# Patient Record
Sex: FEMALE | Race: WHITE | Employment: OTHER | URBAN - METROPOLITAN AREA
[De-identification: names, ages, dates, MRNs, and addresses within clinical notes are randomized per-mention and may not be internally consistent; named-entity substitution may affect disease eponyms.]

---

## 2017-08-24 PROBLEM — Z16.12 UTI DUE TO EXTENDED-SPECTRUM BETA LACTAMASE (ESBL) PRODUCING ESCHERICHIA COLI: Status: ACTIVE | Noted: 2017-08-24

## 2017-08-24 PROBLEM — B96.29 UTI DUE TO EXTENDED-SPECTRUM BETA LACTAMASE (ESBL) PRODUCING ESCHERICHIA COLI: Status: ACTIVE | Noted: 2017-08-24

## 2017-08-24 PROBLEM — I10 HTN (HYPERTENSION): Status: ACTIVE | Noted: 2017-08-24

## 2017-08-24 PROBLEM — N39.0 UTI DUE TO EXTENDED-SPECTRUM BETA LACTAMASE (ESBL) PRODUCING ESCHERICHIA COLI: Status: ACTIVE | Noted: 2017-08-24

## 2017-08-24 PROBLEM — H54.7 BLINDNESS: Status: ACTIVE | Noted: 2017-08-24

## 2017-08-24 PROBLEM — E11.9 DM (DIABETES MELLITUS) (HCC): Status: ACTIVE | Noted: 2017-08-24

## 2017-08-24 PROBLEM — R53.1 GENERALIZED WEAKNESS: Status: ACTIVE | Noted: 2017-08-24

## 2018-01-23 PROBLEM — K21.9 GERD (GASTROESOPHAGEAL REFLUX DISEASE): Status: ACTIVE | Noted: 2018-01-23

## 2018-01-23 PROBLEM — R60.0 BILATERAL LOWER EXTREMITY EDEMA: Status: ACTIVE | Noted: 2018-01-23

## 2018-01-23 PROBLEM — L03.90 CELLULITIS: Status: ACTIVE | Noted: 2018-01-23

## 2018-01-26 PROBLEM — L03.116 BILATERAL LOWER LEG CELLULITIS: Status: ACTIVE | Noted: 2018-01-23

## 2018-01-26 PROBLEM — L03.115 BILATERAL LOWER LEG CELLULITIS: Status: ACTIVE | Noted: 2018-01-23

## 2018-01-26 PROBLEM — H54.8 LEGALLY BLIND: Status: ACTIVE | Noted: 2017-08-24

## 2018-01-26 PROBLEM — E11.65 POORLY CONTROLLED TYPE 2 DIABETES MELLITUS (HCC): Status: ACTIVE | Noted: 2017-08-24

## 2018-01-29 PROBLEM — Z71.89 DIABETES EDUCATION, ENCOUNTER FOR: Status: ACTIVE | Noted: 2017-08-24

## 2020-04-07 ENCOUNTER — TELEPHONE (OUTPATIENT)
Dept: PAIN MANAGEMENT | Age: 85
End: 2020-04-07

## 2021-08-04 ENCOUNTER — HOSPITAL ENCOUNTER (INPATIENT)
Age: 86
LOS: 7 days | Discharge: NURSING, REHAB OR LONG-TERM CARE | DRG: 478 | End: 2021-08-11
Attending: INTERNAL MEDICINE | Admitting: INTERNAL MEDICINE
Payer: MEDICAID

## 2021-08-04 ENCOUNTER — APPOINTMENT (OUTPATIENT)
Dept: CT IMAGING | Age: 86
DRG: 478 | End: 2021-08-04
Payer: MEDICAID

## 2021-08-04 ENCOUNTER — APPOINTMENT (OUTPATIENT)
Dept: GENERAL RADIOLOGY | Age: 86
DRG: 478 | End: 2021-08-04
Payer: MEDICAID

## 2021-08-04 DIAGNOSIS — R10.10 UPPER ABDOMINAL PAIN: Primary | ICD-10-CM

## 2021-08-04 DIAGNOSIS — K83.8 DILATED INTRAHEPATIC BILE DUCT: ICD-10-CM

## 2021-08-04 PROBLEM — R10.9 ABDOMINAL PAIN: Status: ACTIVE | Noted: 2021-08-04

## 2021-08-04 LAB
A/G RATIO: 1 (ref 1.1–2.2)
ALBUMIN SERPL-MCNC: 3.4 G/DL (ref 3.4–5)
ALP BLD-CCNC: 72 U/L (ref 40–129)
ALT SERPL-CCNC: 12 U/L (ref 10–40)
ANION GAP SERPL CALCULATED.3IONS-SCNC: 10 MMOL/L (ref 3–16)
AST SERPL-CCNC: 35 U/L (ref 15–37)
BASOPHILS ABSOLUTE: 0.1 K/UL (ref 0–0.2)
BASOPHILS RELATIVE PERCENT: 0.9 %
BILIRUB SERPL-MCNC: 0.3 MG/DL (ref 0–1)
BUN BLDV-MCNC: 37 MG/DL (ref 7–20)
CALCIUM SERPL-MCNC: 9.6 MG/DL (ref 8.3–10.6)
CHLORIDE BLD-SCNC: 104 MMOL/L (ref 99–110)
CO2: 26 MMOL/L (ref 21–32)
CREAT SERPL-MCNC: 1 MG/DL (ref 0.6–1.2)
EOSINOPHILS ABSOLUTE: 0.2 K/UL (ref 0–0.6)
EOSINOPHILS RELATIVE PERCENT: 3.7 %
GFR AFRICAN AMERICAN: >60
GFR NON-AFRICAN AMERICAN: 52
GLOBULIN: 3.5 G/DL
GLUCOSE BLD-MCNC: 124 MG/DL (ref 70–99)
GLUCOSE BLD-MCNC: 97 MG/DL (ref 70–99)
HCT VFR BLD CALC: 35.9 % (ref 36–48)
HEMOGLOBIN: 11.5 G/DL (ref 12–16)
LIPASE: 20 U/L (ref 13–60)
LYMPHOCYTES ABSOLUTE: 2.1 K/UL (ref 1–5.1)
LYMPHOCYTES RELATIVE PERCENT: 34.1 %
MCH RBC QN AUTO: 30.9 PG (ref 26–34)
MCHC RBC AUTO-ENTMCNC: 32 G/DL (ref 31–36)
MCV RBC AUTO: 96.7 FL (ref 80–100)
MONOCYTES ABSOLUTE: 0.5 K/UL (ref 0–1.3)
MONOCYTES RELATIVE PERCENT: 8.6 %
NEUTROPHILS ABSOLUTE: 3.3 K/UL (ref 1.7–7.7)
NEUTROPHILS RELATIVE PERCENT: 52.7 %
PDW BLD-RTO: 16.1 % (ref 12.4–15.4)
PERFORMED ON: NORMAL
PLATELET # BLD: 207 K/UL (ref 135–450)
PMV BLD AUTO: 8.7 FL (ref 5–10.5)
POTASSIUM SERPL-SCNC: 4.7 MMOL/L (ref 3.5–5.1)
RBC # BLD: 3.71 M/UL (ref 4–5.2)
SODIUM BLD-SCNC: 140 MMOL/L (ref 136–145)
TOTAL PROTEIN: 6.9 G/DL (ref 6.4–8.2)
TROPONIN: 0.02 NG/ML
WBC # BLD: 6.2 K/UL (ref 4–11)

## 2021-08-04 PROCEDURE — 74177 CT ABD & PELVIS W/CONTRAST: CPT

## 2021-08-04 PROCEDURE — 36415 COLL VENOUS BLD VENIPUNCTURE: CPT

## 2021-08-04 PROCEDURE — 96375 TX/PRO/DX INJ NEW DRUG ADDON: CPT

## 2021-08-04 PROCEDURE — 96374 THER/PROPH/DIAG INJ IV PUSH: CPT

## 2021-08-04 PROCEDURE — 6370000000 HC RX 637 (ALT 250 FOR IP): Performed by: INTERNAL MEDICINE

## 2021-08-04 PROCEDURE — 93005 ELECTROCARDIOGRAM TRACING: CPT | Performed by: PHYSICIAN ASSISTANT

## 2021-08-04 PROCEDURE — 2580000003 HC RX 258: Performed by: INTERNAL MEDICINE

## 2021-08-04 PROCEDURE — 6360000002 HC RX W HCPCS: Performed by: INTERNAL MEDICINE

## 2021-08-04 PROCEDURE — 85025 COMPLETE CBC W/AUTO DIFF WBC: CPT

## 2021-08-04 PROCEDURE — 71045 X-RAY EXAM CHEST 1 VIEW: CPT

## 2021-08-04 PROCEDURE — 2580000003 HC RX 258: Performed by: PHYSICIAN ASSISTANT

## 2021-08-04 PROCEDURE — 6360000002 HC RX W HCPCS: Performed by: PHYSICIAN ASSISTANT

## 2021-08-04 PROCEDURE — 83690 ASSAY OF LIPASE: CPT

## 2021-08-04 PROCEDURE — 6360000004 HC RX CONTRAST MEDICATION: Performed by: PHYSICIAN ASSISTANT

## 2021-08-04 PROCEDURE — 1200000000 HC SEMI PRIVATE

## 2021-08-04 PROCEDURE — 84484 ASSAY OF TROPONIN QUANT: CPT

## 2021-08-04 PROCEDURE — 81003 URINALYSIS AUTO W/O SCOPE: CPT

## 2021-08-04 PROCEDURE — 99284 EMERGENCY DEPT VISIT MOD MDM: CPT

## 2021-08-04 PROCEDURE — 80053 COMPREHEN METABOLIC PANEL: CPT

## 2021-08-04 PROCEDURE — 2500000003 HC RX 250 WO HCPCS: Performed by: INTERNAL MEDICINE

## 2021-08-04 RX ORDER — DOCUSATE SODIUM 100 MG/1
100 CAPSULE, LIQUID FILLED ORAL 2 TIMES DAILY PRN
Status: DISCONTINUED | OUTPATIENT
Start: 2021-08-04 | End: 2021-08-04

## 2021-08-04 RX ORDER — DEXTROSE MONOHYDRATE 25 G/50ML
12.5 INJECTION, SOLUTION INTRAVENOUS PRN
Status: DISCONTINUED | OUTPATIENT
Start: 2021-08-04 | End: 2021-08-11 | Stop reason: HOSPADM

## 2021-08-04 RX ORDER — ONDANSETRON 2 MG/ML
4 INJECTION INTRAMUSCULAR; INTRAVENOUS ONCE
Status: COMPLETED | OUTPATIENT
Start: 2021-08-04 | End: 2021-08-04

## 2021-08-04 RX ORDER — ATORVASTATIN CALCIUM 10 MG/1
10 TABLET, FILM COATED ORAL DAILY
COMMUNITY

## 2021-08-04 RX ORDER — HYDROMORPHONE HYDROCHLORIDE 1 MG/ML
0.5 INJECTION, SOLUTION INTRAMUSCULAR; INTRAVENOUS; SUBCUTANEOUS EVERY 4 HOURS PRN
Status: DISCONTINUED | OUTPATIENT
Start: 2021-08-04 | End: 2021-08-11 | Stop reason: HOSPADM

## 2021-08-04 RX ORDER — PANTOPRAZOLE SODIUM 40 MG/1
40 TABLET, DELAYED RELEASE ORAL
Status: DISCONTINUED | OUTPATIENT
Start: 2021-08-05 | End: 2021-08-11 | Stop reason: HOSPADM

## 2021-08-04 RX ORDER — GLIPIZIDE 5 MG/1
5 TABLET ORAL
Status: DISCONTINUED | OUTPATIENT
Start: 2021-08-05 | End: 2021-08-04

## 2021-08-04 RX ORDER — POLYETHYLENE GLYCOL 3350 17 G/17G
17 POWDER, FOR SOLUTION ORAL DAILY
COMMUNITY

## 2021-08-04 RX ORDER — TIMOLOL MALEATE 5 MG/ML
1 SOLUTION/ DROPS OPHTHALMIC 2 TIMES DAILY
Status: DISCONTINUED | OUTPATIENT
Start: 2021-08-04 | End: 2021-08-11 | Stop reason: HOSPADM

## 2021-08-04 RX ORDER — CLONIDINE HYDROCHLORIDE 0.1 MG/1
0.1 TABLET ORAL 2 TIMES DAILY PRN
Status: ON HOLD | COMMUNITY
End: 2021-08-11 | Stop reason: HOSPADM

## 2021-08-04 RX ORDER — LOPERAMIDE HYDROCHLORIDE 2 MG/1
2 TABLET ORAL 4 TIMES DAILY PRN
COMMUNITY

## 2021-08-04 RX ORDER — CETIRIZINE HYDROCHLORIDE 10 MG/1
5 TABLET ORAL DAILY
Status: DISCONTINUED | OUTPATIENT
Start: 2021-08-05 | End: 2021-08-11 | Stop reason: HOSPADM

## 2021-08-04 RX ORDER — BISACODYL 10 MG
10 SUPPOSITORY, RECTAL RECTAL DAILY PRN
Status: DISCONTINUED | OUTPATIENT
Start: 2021-08-04 | End: 2021-08-04

## 2021-08-04 RX ORDER — ASPIRIN 81 MG/1
81 TABLET, CHEWABLE ORAL DAILY
COMMUNITY

## 2021-08-04 RX ORDER — M-VIT,TX,IRON,MINS/CALC/FOLIC 27MG-0.4MG
1 TABLET ORAL DAILY
Status: DISCONTINUED | OUTPATIENT
Start: 2021-08-05 | End: 2021-08-11 | Stop reason: HOSPADM

## 2021-08-04 RX ORDER — LOPERAMIDE HYDROCHLORIDE 2 MG/1
2 TABLET ORAL 4 TIMES DAILY PRN
Status: DISCONTINUED | OUTPATIENT
Start: 2021-08-04 | End: 2021-08-04

## 2021-08-04 RX ORDER — LIDOCAINE 4 G/G
1 PATCH TOPICAL DAILY
Status: DISCONTINUED | OUTPATIENT
Start: 2021-08-05 | End: 2021-08-04

## 2021-08-04 RX ORDER — DEXTROSE MONOHYDRATE 50 MG/ML
100 INJECTION, SOLUTION INTRAVENOUS PRN
Status: DISCONTINUED | OUTPATIENT
Start: 2021-08-04 | End: 2021-08-11 | Stop reason: HOSPADM

## 2021-08-04 RX ORDER — ONDANSETRON 2 MG/ML
4 INJECTION INTRAMUSCULAR; INTRAVENOUS EVERY 6 HOURS PRN
Status: DISCONTINUED | OUTPATIENT
Start: 2021-08-04 | End: 2021-08-11 | Stop reason: HOSPADM

## 2021-08-04 RX ORDER — LORATADINE 10 MG/1
10 TABLET ORAL DAILY
COMMUNITY

## 2021-08-04 RX ORDER — 0.9 % SODIUM CHLORIDE 0.9 %
1000 INTRAVENOUS SOLUTION INTRAVENOUS ONCE
Status: COMPLETED | OUTPATIENT
Start: 2021-08-04 | End: 2021-08-04

## 2021-08-04 RX ORDER — TORSEMIDE 20 MG/1
60 TABLET ORAL DAILY
COMMUNITY

## 2021-08-04 RX ORDER — GLUCOSAM/CHONDRO/HERB 149/HYAL 750-100 MG
1 TABLET ORAL DAILY
Status: DISCONTINUED | OUTPATIENT
Start: 2021-08-05 | End: 2021-08-04

## 2021-08-04 RX ORDER — ESTRADIOL 0.1 MG/G
2 CREAM VAGINAL SEE ADMIN INSTRUCTIONS
COMMUNITY

## 2021-08-04 RX ORDER — ESTRADIOL 0.1 MG/G
2 CREAM VAGINAL
Status: DISCONTINUED | OUTPATIENT
Start: 2021-08-05 | End: 2021-08-11 | Stop reason: HOSPADM

## 2021-08-04 RX ORDER — AMLODIPINE BESYLATE 10 MG/1
10 TABLET ORAL NIGHTLY
COMMUNITY

## 2021-08-04 RX ORDER — BRIMONIDINE TARTRATE 2 MG/ML
1 SOLUTION/ DROPS OPHTHALMIC 2 TIMES DAILY
Status: DISCONTINUED | OUTPATIENT
Start: 2021-08-04 | End: 2021-08-11 | Stop reason: HOSPADM

## 2021-08-04 RX ORDER — NYSTATIN 100000 [USP'U]/G
POWDER TOPICAL PRN
COMMUNITY

## 2021-08-04 RX ORDER — ALLOPURINOL 100 MG/1
100 TABLET ORAL DAILY
COMMUNITY

## 2021-08-04 RX ORDER — NICOTINE POLACRILEX 4 MG
15 LOZENGE BUCCAL PRN
Status: DISCONTINUED | OUTPATIENT
Start: 2021-08-04 | End: 2021-08-11 | Stop reason: HOSPADM

## 2021-08-04 RX ORDER — MORPHINE SULFATE 4 MG/ML
4 INJECTION, SOLUTION INTRAMUSCULAR; INTRAVENOUS EVERY 30 MIN PRN
Status: DISCONTINUED | OUTPATIENT
Start: 2021-08-04 | End: 2021-08-04 | Stop reason: ALTCHOICE

## 2021-08-04 RX ORDER — INSULIN LISPRO 100 [IU]/ML
10 INJECTION, SOLUTION INTRAVENOUS; SUBCUTANEOUS
Status: DISCONTINUED | OUTPATIENT
Start: 2021-08-04 | End: 2021-08-11 | Stop reason: HOSPADM

## 2021-08-04 RX ORDER — SENNA AND DOCUSATE SODIUM 50; 8.6 MG/1; MG/1
1 TABLET, FILM COATED ORAL DAILY
Status: DISCONTINUED | OUTPATIENT
Start: 2021-08-05 | End: 2021-08-11 | Stop reason: HOSPADM

## 2021-08-04 RX ORDER — ACETAMINOPHEN 500 MG
500 TABLET ORAL 3 TIMES DAILY
Status: DISCONTINUED | OUTPATIENT
Start: 2021-08-04 | End: 2021-08-11 | Stop reason: HOSPADM

## 2021-08-04 RX ORDER — SENNA AND DOCUSATE SODIUM 50; 8.6 MG/1; MG/1
1 TABLET, FILM COATED ORAL DAILY
COMMUNITY

## 2021-08-04 RX ORDER — SODIUM CHLORIDE 9 MG/ML
INJECTION, SOLUTION INTRAVENOUS CONTINUOUS
Status: DISCONTINUED | OUTPATIENT
Start: 2021-08-04 | End: 2021-08-11 | Stop reason: HOSPADM

## 2021-08-04 RX ORDER — TORSEMIDE 20 MG/1
60 TABLET ORAL DAILY
Status: DISCONTINUED | OUTPATIENT
Start: 2021-08-05 | End: 2021-08-11 | Stop reason: HOSPADM

## 2021-08-04 RX ORDER — ATENOLOL 25 MG/1
12.5 TABLET ORAL DAILY
Status: DISCONTINUED | OUTPATIENT
Start: 2021-08-05 | End: 2021-08-11 | Stop reason: HOSPADM

## 2021-08-04 RX ORDER — DORZOLAMIDE HCL 20 MG/ML
1 SOLUTION/ DROPS OPHTHALMIC 2 TIMES DAILY
Status: DISCONTINUED | OUTPATIENT
Start: 2021-08-04 | End: 2021-08-11 | Stop reason: HOSPADM

## 2021-08-04 RX ORDER — ALOGLIPTIN 25 MG/1
25 TABLET, FILM COATED ORAL DAILY
Status: DISCONTINUED | OUTPATIENT
Start: 2021-08-05 | End: 2021-08-04

## 2021-08-04 RX ORDER — FUROSEMIDE 40 MG/1
40 TABLET ORAL DAILY
Status: DISCONTINUED | OUTPATIENT
Start: 2021-08-05 | End: 2021-08-04

## 2021-08-04 RX ORDER — DORZOLAMIDE HYDROCHLORIDE AND TIMOLOL MALEATE 20; 5 MG/ML; MG/ML
1 SOLUTION/ DROPS OPHTHALMIC 2 TIMES DAILY
Status: DISCONTINUED | OUTPATIENT
Start: 2021-08-04 | End: 2021-08-04 | Stop reason: CLARIF

## 2021-08-04 RX ORDER — VITAMIN E 268 MG
400 CAPSULE ORAL DAILY
Status: DISCONTINUED | OUTPATIENT
Start: 2021-08-05 | End: 2021-08-11 | Stop reason: HOSPADM

## 2021-08-04 RX ORDER — NIFEDIPINE 30 MG/1
60 TABLET, EXTENDED RELEASE ORAL DAILY
Status: DISCONTINUED | OUTPATIENT
Start: 2021-08-05 | End: 2021-08-04

## 2021-08-04 RX ORDER — ALLOPURINOL 100 MG/1
100 TABLET ORAL DAILY
Status: DISCONTINUED | OUTPATIENT
Start: 2021-08-05 | End: 2021-08-11 | Stop reason: HOSPADM

## 2021-08-04 RX ORDER — ACETAMINOPHEN 500 MG
500 TABLET ORAL 3 TIMES DAILY
COMMUNITY

## 2021-08-04 RX ORDER — AMLODIPINE BESYLATE 5 MG/1
10 TABLET ORAL DAILY
Status: DISCONTINUED | OUTPATIENT
Start: 2021-08-05 | End: 2021-08-11 | Stop reason: HOSPADM

## 2021-08-04 RX ORDER — BISACODYL 10 MG
10 SUPPOSITORY, RECTAL RECTAL DAILY PRN
COMMUNITY

## 2021-08-04 RX ORDER — CLONIDINE HYDROCHLORIDE 0.1 MG/1
0.1 TABLET ORAL 2 TIMES DAILY PRN
Status: DISCONTINUED | OUTPATIENT
Start: 2021-08-04 | End: 2021-08-04

## 2021-08-04 RX ORDER — ATORVASTATIN CALCIUM 10 MG/1
10 TABLET, FILM COATED ORAL DAILY
Status: DISCONTINUED | OUTPATIENT
Start: 2021-08-05 | End: 2021-08-11 | Stop reason: HOSPADM

## 2021-08-04 RX ORDER — ASPIRIN 81 MG/1
81 TABLET, CHEWABLE ORAL DAILY
Status: DISCONTINUED | OUTPATIENT
Start: 2021-08-05 | End: 2021-08-11 | Stop reason: HOSPADM

## 2021-08-04 RX ORDER — LISINOPRIL 10 MG/1
10 TABLET ORAL DAILY
Status: DISCONTINUED | OUTPATIENT
Start: 2021-08-05 | End: 2021-08-04

## 2021-08-04 RX ORDER — CALCIUM CARBONATE 500(1250)
600 TABLET ORAL DAILY
Status: DISCONTINUED | OUTPATIENT
Start: 2021-08-05 | End: 2021-08-04

## 2021-08-04 RX ORDER — LACTOBACILLUS RHAMNOSUS GG 10B CELL
1 CAPSULE ORAL DAILY
Status: DISCONTINUED | OUTPATIENT
Start: 2021-08-05 | End: 2021-08-11 | Stop reason: HOSPADM

## 2021-08-04 RX ORDER — POLYETHYLENE GLYCOL 3350 17 G/17G
17 POWDER, FOR SOLUTION ORAL DAILY
Status: DISCONTINUED | OUTPATIENT
Start: 2021-08-05 | End: 2021-08-11 | Stop reason: HOSPADM

## 2021-08-04 RX ADMIN — MICONAZOLE NITRATE: 20 POWDER TOPICAL at 23:13

## 2021-08-04 RX ADMIN — DORZOLAMIDE HYDROCHLORIDE 1 DROP: 20 SOLUTION/ DROPS OPHTHALMIC at 23:23

## 2021-08-04 RX ADMIN — BRIMONIDINE TARTRATE 1 DROP: 2 SOLUTION OPHTHALMIC at 23:24

## 2021-08-04 RX ADMIN — SODIUM CHLORIDE: 9 INJECTION, SOLUTION INTRAVENOUS at 23:08

## 2021-08-04 RX ADMIN — Medication 1000 MG: at 23:12

## 2021-08-04 RX ADMIN — ONDANSETRON 4 MG: 2 INJECTION INTRAMUSCULAR; INTRAVENOUS at 16:25

## 2021-08-04 RX ADMIN — ACETAMINOPHEN 500 MG: 500 TABLET, FILM COATED ORAL at 23:08

## 2021-08-04 RX ADMIN — IOPAMIDOL 75 ML: 755 INJECTION, SOLUTION INTRAVENOUS at 17:30

## 2021-08-04 RX ADMIN — TIMOLOL MALEATE 1 DROP: 5 SOLUTION OPHTHALMIC at 23:23

## 2021-08-04 RX ADMIN — MORPHINE SULFATE 4 MG: 4 INJECTION, SOLUTION INTRAMUSCULAR; INTRAVENOUS at 16:25

## 2021-08-04 RX ADMIN — SODIUM CHLORIDE 1000 ML: 9 INJECTION, SOLUTION INTRAVENOUS at 16:27

## 2021-08-04 ASSESSMENT — ENCOUNTER SYMPTOMS
DIARRHEA: 0
ABDOMINAL PAIN: 1
NAUSEA: 0
VOMITING: 0
RHINORRHEA: 0
SHORTNESS OF BREATH: 0
COUGH: 0

## 2021-08-04 ASSESSMENT — PAIN DESCRIPTION - LOCATION
LOCATION: BACK;ABDOMEN
LOCATION: BACK;ABDOMEN

## 2021-08-04 ASSESSMENT — PAIN DESCRIPTION - ORIENTATION: ORIENTATION: LEFT

## 2021-08-04 ASSESSMENT — PAIN DESCRIPTION - PAIN TYPE
TYPE: ACUTE PAIN
TYPE: ACUTE PAIN

## 2021-08-04 ASSESSMENT — PAIN SCALES - GENERAL
PAINLEVEL_OUTOF10: 6
PAINLEVEL_OUTOF10: 3
PAINLEVEL_OUTOF10: 2

## 2021-08-04 NOTE — ED PROVIDER NOTES
I was asked for an EKG interpretation. Otherwise, I did not evaluate the patient. I was available for consultation. EKG  The Ekg interpreted by me in the absence of a cardiologist shows. 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 No new ST-T wave changes appreciated when compared to prior EKG dated January 23, 2018  No evidence of acute ischemia.        Mike Vasquez MD  08/04/21 3483

## 2021-08-04 NOTE — ED NOTES
Bed: 09  Expected date:   Expected time:   Means of arrival: [facility]  Comments:  [address] RN  08/04/21 8919

## 2021-08-04 NOTE — ED PROVIDER NOTES
[facility]        Pt Name: Katrin Mantilla  MRN: [id number]  [place] 3/20/1933  Date of evaluation: 8/4/2021  Provider: Linette Medina PA-C  PCP: Yohan Amor  Note Started: 5:34 PM EDT       MALIHA. I have evaluated this patient. My supervising physician was available for consultation. CHIEF COMPLAINT       Chief Complaint   Patient presents with    Abdominal Pain     Pt. arrived via EMS with c/o abdominal pain, flank pain, and states that she had a US done yesterday at the facility and stated that the tech told her that there are gallstones. Pt. is unsure of how many present       HISTORY OF PRESENT ILLNESS   (Location, Timing/Onset, Context/Setting, Quality, Duration, Modifying Factors, Severity, Associated Signs and Symptoms)  Note limiting factors. Chief Complaint: Abdominal pain    Katrin Mantilla is a 80 y.o. female who presents to the emergency department today for evaluation for right upper quadrant abdominal pain and right-sided flank pain. The patient states that she has been experiencing the symptoms and she states that they have been ongoing for the past 7 to 10 days. The patient states that initially they thought that her pain was due to a muscle relaxer, as she states that her pain would worsen if she would move around. The patient states that she continued to have pain, and she states an ultrasound was performed 2 days ago which did show gallstones patient states that she continues to have pain although she states that she is at the same pain for the past 7 to 10days patient currently is rating her pain as a 6/10. She states that her pain improves if she lies on her left side, and does not move, seems to be worse with movement. Patient states that she was nauseous couple days ago but is otherwise not had any nausea, vomiting or diarrhea. She is not had any fever or chills. No cough or congestion. No chest pain or shortness of breath. No urinary symptoms. Nursing Notes were all reviewed and agreed with or any disagreements were addressed in the HPI. REVIEW OF SYSTEMS    (2-9 systems for level 4, 10 or more for level 5)     Review of Systems   Constitutional: Negative for activity change, appetite change, chills and fever. HENT: Negative for congestion and rhinorrhea. Respiratory: Negative for cough and shortness of breath. Cardiovascular: Negative for chest pain. Gastrointestinal: Positive for abdominal pain. Negative for diarrhea, nausea and vomiting. Genitourinary: Negative for difficulty urinating, dysuria and hematuria. Positives and Pertinent negatives as per HPI. Except as noted above in the ROS, all other systems were reviewed and negative.        PAST MEDICAL HISTORY     Past Medical History:   Diagnosis Date    Diabetes mellitus ([address]     Edema leg     ESBL (extended spectrum beta-lactamase) producing bacteria infection 01/23/2018    urine    GERD (gastroesophageal reflux disease)     Hypercholesteremia     Hypertension     Lumbar spinal stenosis     Osteoarthritis     Thyroid disease     had radioactive iodine    UTI          SURGICAL HISTORY     Past Surgical History:   Procedure Laterality Date    DILATION AND CURETTAGE OF UTERUS      LEG SURGERY      [facility]       Previous Medications    ACETAMINOPHEN (TYLENOL) 500 MG TABLET    Take 500 mg by mouth 3 times daily    ALLOPURINOL (ZYLOPRIM) 100 MG TABLET    Take 100 mg by mouth daily    AMLODIPINE (NORVASC) 10 MG TABLET    Take 10 mg by mouth daily    ASPIRIN 81 MG CHEWABLE TABLET    Take 81 mg by mouth daily    ATENOLOL (TENORMIN) 25 MG TABLET    Take 0.5 tablets by mouth daily    ATORVASTATIN (LIPITOR) 10 MG TABLET    Take 10 mg by mouth daily    BISACODYL (DULCOLAX) 10 MG SUPPOSITORY    Place 10 mg rectally daily as needed for Constipation    BRIMONIDINE (ALPHAGAN P) 0.1 % SOLN    Place 1 drop into both eyes 2 times daily     CALCIUM 600-400 MG-UNIT CHEW    Take 1 tablet by mouth 2 times daily    CALCIUM CARBONATE (OSCAL) 500 MG TABS TABLET    Take 600 mg by mouth daily    CLONIDINE (CATAPRES) 0.1 MG TABLET    Take 0.1 mg by mouth 2 times daily as needed for High Blood Pressure (SBP >160)    DICLOFENAC SODIUM 1 % GEL    Apply  topically 2 times daily. DOCUSATE SODIUM (COLACE) 100 MG CAPSULE    Take 100 mg by mouth 2 times daily as needed for Constipation    DOCUSATE SODIUM (COLACE, DULCOLAX) 100 MG CAPS    Take 100 mg by mouth 2 times daily as needed for Constipation    DORZOLAMIDE-TIMOLOL (COSOPT) 22.3-6.8 MG/ML OPHTHALMIC SOLUTION    Place 1 drop into both eyes 2 times daily     ESTRADIOL (ESTRACE) 0.1 MG/GM VAGINAL CREAM    Place 2 g vaginally See Admin Instructions Every Monday and Thursday    FUROSEMIDE (LASIX) 40 MG TABLET    Take 1 tablet by mouth daily    GLIMEPIRIDE (AMARYL) 1 MG TABLET    Take 2 mg by mouth every morning (before breakfast)     HYDROCORTISONE 2.5 % OINTMENT    Apply topically daily Apply to BLE rash    INSULIN GLARGINE (BASAGLAR KWIKPEN SC)    Inject 25 Units into the skin daily    INSULIN LISPRO (HUMALOG) 100 UNIT/ML INJECTION VIAL    Inject 10 Units into the skin 3 times daily (before meals)    LACTOBACILLUS (ACIDOPHILUS PO)    Take 1 tablet by mouth daily    LIDOCAINE (LIDODERM) 5 %    Place 1 patch onto the skin daily. 12 hours on, 12 hours off. LISINOPRIL (PRINIVIL;ZESTRIL) 10 MG TABLET    Take 1 tablet by mouth daily    LOPERAMIDE (IMODIUM A-D) 2 MG TABLET    Take 2 mg by mouth 4 times daily as needed for Diarrhea    LORATADINE (CLARITIN) 10 MG TABLET    Take 10 mg by mouth daily    MAGNESIUM HYDROXIDE (MILK OF MAGNESIA) 400 MG/5ML SUSPENSION    Take 30 mLs by mouth daily as needed for Constipation    MICONAZOLE (MICOTIN) 2 % POWDER    Apply topically 2 times daily Apply topically 2 times daily. MISC NATURAL PRODUCTS (GLUCOSAMINE CHOND COMPLEX/MSM PO)    Take by mouth    MULTIPLE VITAMINS-MINERALS (THERAPEUTIC MULTIVITAMIN-MINERALS) TABLET    Take 1 tablet by mouth daily    NIFEDIPINE (PROCARDIA XL) 60 MG CR TABLET    Take 60 mg by mouth daily. NYSTATIN (MYCOSTATIN) 729173 UNIT/GM POWDER    Apply topically as needed Under breast    OMEPRAZOLE (PRILOSEC) 10 MG CAPSULE    Take 20 mg by mouth daily. POLYETHYLENE GLYCOL (GLYCOLAX) 17 GM/SCOOP POWDER    Take 17 g by mouth daily    SENNOSIDES-DOCUSATE SODIUM (SENOKOT-S) 8.6-50 MG TABLET    Take 1 tablet by mouth daily    SITAGLIPTAN (JANUVIA) 50 MG TABLET    Take 50 mg by mouth daily. TORSEMIDE (DEMADEX) 20 MG TABLET    Take 60 mg by mouth daily    VITAMIN E 400 UNIT CAPSULE    Take 400 Units by mouth daily         ALLERGIES     Adhesive tape and Simvastatin    FAMILYHISTORY       Family History   Problem Relation Age of Onset    Diabetes Mother     Other Mother     Cancer Paternal Aunt     Cancer Paternal Uncle           SOCIAL HISTORY       Social History     Tobacco Use    Smoking status: Never Smoker   Substance Use Topics    Alcohol use: No    Drug use: No       SCREENINGS             PHYSICAL EXAM    (up to 7 for level 4, 8 or more for level 5)     ED Triage Vitals   BP Temp Temp Source Pulse Resp SpO2 Height Weight   08/04/21 1557 08/04/21 1559 08/04/21 1559 08/04/21 1559 08/04/21 1605 08/04/21 1559 08/04/21 1559 08/04/21 1559   (!) 161/71 97.7 °F (36.5 °C) Oral 58 18 99 % 5' 8\" (1.727 m) 260 lb (117.9 kg)       Physical Exam  Vitals and nursing note reviewed. Constitutional:       Appearance: She is well-developed. She is not diaphoretic. HENT:      Head: Normocephalic and atraumatic. Right Ear: External ear normal.      Left Ear: External ear normal.      Nose: Nose normal.   Eyes:      General:         Right eye: No discharge. Left eye: No discharge. Neck:      Trachea: No tracheal deviation.    Cardiovascular: Rate and Rhythm: Regular rhythm. Bradycardia present. Pulmonary:      Effort: Pulmonary effort is normal. No respiratory distress. Breath sounds: Normal breath sounds. No wheezing or rales. Abdominal:      General: Bowel sounds are normal. There is no distension. Palpations: Abdomen is soft. Tenderness: There is abdominal tenderness in the right upper quadrant, epigastric area and left upper quadrant. There is no guarding. Musculoskeletal:         General: Normal range of motion. Cervical back: Normal range of motion and neck supple. Skin:     General: Skin is warm and dry. Neurological:      Mental Status: She is alert and oriented to person, place, and time.    Psychiatric:         Behavior: Behavior normal.         DIAGNOSTIC RESULTS   LABS:    Labs Reviewed   CBC WITH AUTO DIFFERENTIAL - Abnormal; Notable for the following components:       Result Value    RBC 3.71 (*)     Hemoglobin 11.5 (*)     Hematocrit 35.9 (*)     RDW 16.1 (*)     All other components within normal limits    Narrative:     Performed at:  [facility] [address], [place], [address] [place]   Phone [phone]   COMPREHENSIVE METABOLIC PANEL - Abnormal; Notable for the following components:    Glucose 124 (*)     BUN 37 (*)     GFR Non-African American 52 (*)     Albumin/Globulin Ratio 1.0 (*)     All other components within normal limits    Narrative:     Performed at:  [facility] [address], [place], [address] [place]   Phone [phone]   TROPONIN - Abnormal; Notable for the following components:    Troponin 0.02 (*)     All other components within normal limits    Narrative:     Performed at:  [facility] [address],  [place] [address] [place]   Phone [phone]   LIPASE    Narrative:     Performed at:  [facility] [address],  [place], [facility] [place]   Phone [phone]   URINE RT REFLEX TO CULTURE       When ordered only abnormal lab results are displayed. All other labs were within normal range or not returned as of this dictation. EKG: When ordered, EKG's are interpreted by the Emergency Department Physician in the absence of a cardiologist.  Please see their note for interpretation of EKG. RADIOLOGY:   Non-plain film images such as CT, Ultrasound and MRI are read by the radiologist. Plain radiographic images are visualized and preliminarily interpreted by the ED Provider with the below findings:        Interpretation per the Radiologist below, if available at the time of this note:    CT ABDOMEN PELVIS W IV CONTRAST Additional Contrast? None   Final Result   1. Nonspecific mild intrahepatic and extrahepatic biliary dilatation without   definite obstructing stone within limits of CT. Correlate with laboratory   values. Dedicated MRCP or ERCP could be obtained for further evaluation as   clinically indicated. 2. Small right pleural effusion and associated atelectasis. 3. Severely atrophic pancreas with numerous calcifications present suggesting   sequela of remote pancreatitis. 4. Moderate volume of stool throughout the colon with no evidence for bowel   obstruction. 5. Heterogeneous appearance of the uterus which is not well evaluated on CT. Ultrasound could be obtained for further evaluation on a nonemergent basis as   clinically indicated taking into account patient age and comorbidities. 6. Severe atherosclerotic disease. 7. 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 Approximately 10% height loss. Dedicated MRI of the lumbar spine could be   obtained for further evaluation if the patient has acute back pain.          XR CHEST PORTABLE   Final Result   Stable borderline cardiomegaly with minimal central pulmonary congestion or   pulmonary artery past 7 to 10 days. She had an ultrasound done at the nursing home facility which showed bowel sounds. Patient continues to have pain    On examination, she does have diffuse tenderness across her upper abdomen. Although I do not suspect ACS due to the patient's age and report of upper abdominal pain, EKG and troponin will be obtained. EKG as documented by my attending, please see his note for further details. CBC shows no evidence of leukocytosis, mild anemia. Her CMP did show dehydration with BUN of 37, therefore initially fluids were ordered, however chest x-ray does show possible pulmonary congestion, will DC fluids    troponin is elevated at 0.02, however I feel this is likely secondary to renal function, also due to specimen hemolysis. CT of the abdomen and pelvis shows nonspecific mild intrahepatic and extrahepatic biliary dilatation. There is a small right pleural effusion. patient continues to have upper abdominal pain, and concern for her intrahepatic and extrahepatic biliary ductal dilatation feel that she will need to be admitted for further care and evaluation. Dr. Modesto Fowler has agreed for admission, patient is stable for admission      FINAL IMPRESSION      1. Upper abdominal pain    2. Dilated intrahepatic bile duct          DISPOSITION/PLAN   DISPOSITION Admitted 08/04/2021 07:20:47 PM      PATIENT REFERRED TO:  No follow-up provider specified.     DISCHARGE MEDICATIONS:  New Prescriptions    No medications on file       DISCONTINUED MEDICATIONS:  Discontinued Medications    No medications on file              (Please note that portions of this note were completed with a voice recognition program.  Efforts were made to edit the dictations but occasionally words are mis-transcribed.)    Balbir Saeed PA-C (electronically signed)            Balbir Saeed PA-C  08/04/21 1946

## 2021-08-05 LAB
A/G RATIO: 0.9 (ref 1.1–2.2)
ALBUMIN SERPL-MCNC: 3.3 G/DL (ref 3.4–5)
ALP BLD-CCNC: 72 U/L (ref 40–129)
ALT SERPL-CCNC: 10 U/L (ref 10–40)
ANION GAP SERPL CALCULATED.3IONS-SCNC: 14 MMOL/L (ref 3–16)
AST SERPL-CCNC: 21 U/L (ref 15–37)
BILIRUB SERPL-MCNC: 0.4 MG/DL (ref 0–1)
BILIRUBIN URINE: NEGATIVE
BLOOD, URINE: NEGATIVE
BUN BLDV-MCNC: 37 MG/DL (ref 7–20)
CALCIUM SERPL-MCNC: 9.3 MG/DL (ref 8.3–10.6)
CHLORIDE BLD-SCNC: 106 MMOL/L (ref 99–110)
CLARITY: CLEAR
CO2: 21 MMOL/L (ref 21–32)
COLOR: YELLOW
CREAT SERPL-MCNC: 1 MG/DL (ref 0.6–1.2)
EKG ATRIAL RATE: 59 BPM
EKG DIAGNOSIS: NORMAL
EKG P AXIS: 96 DEGREES
EKG P-R INTERVAL: 234 MS
EKG Q-T INTERVAL: 502 MS
EKG QRS DURATION: 142 MS
EKG QTC CALCULATION (BAZETT): 496 MS
EKG R AXIS: -48 DEGREES
EKG T AXIS: -36 DEGREES
EKG VENTRICULAR RATE: 59 BPM
GFR AFRICAN AMERICAN: >60
GFR NON-AFRICAN AMERICAN: 52
GLOBULIN: 3.5 G/DL
GLUCOSE BLD-MCNC: 124 MG/DL (ref 70–99)
GLUCOSE BLD-MCNC: 129 MG/DL (ref 70–99)
GLUCOSE BLD-MCNC: 134 MG/DL (ref 70–99)
GLUCOSE BLD-MCNC: 140 MG/DL (ref 70–99)
GLUCOSE BLD-MCNC: 161 MG/DL (ref 70–99)
GLUCOSE BLD-MCNC: 209 MG/DL (ref 70–99)
GLUCOSE URINE: NEGATIVE MG/DL
HCT VFR BLD CALC: 33.9 % (ref 36–48)
HEMOGLOBIN: 10.9 G/DL (ref 12–16)
KETONES, URINE: NEGATIVE MG/DL
LEUKOCYTE ESTERASE, URINE: NEGATIVE
LIPASE: 13 U/L (ref 13–60)
MCH RBC QN AUTO: 31.5 PG (ref 26–34)
MCHC RBC AUTO-ENTMCNC: 32.3 G/DL (ref 31–36)
MCV RBC AUTO: 97.8 FL (ref 80–100)
MICROSCOPIC EXAMINATION: NORMAL
NITRITE, URINE: NEGATIVE
PDW BLD-RTO: 16.5 % (ref 12.4–15.4)
PERFORMED ON: ABNORMAL
PH UA: 5.5 (ref 5–8)
PLATELET # BLD: 184 K/UL (ref 135–450)
PMV BLD AUTO: 8.6 FL (ref 5–10.5)
POTASSIUM SERPL-SCNC: 4.4 MMOL/L (ref 3.5–5.1)
PROTEIN UA: NEGATIVE MG/DL
RBC # BLD: 3.47 M/UL (ref 4–5.2)
SODIUM BLD-SCNC: 141 MMOL/L (ref 136–145)
SPECIFIC GRAVITY UA: 1.02 (ref 1–1.03)
TOTAL PROTEIN: 6.8 G/DL (ref 6.4–8.2)
URINE REFLEX TO CULTURE: NORMAL
URINE TYPE: NORMAL
UROBILINOGEN, URINE: 0.2 E.U./DL
WBC # BLD: 6.5 K/UL (ref 4–11)

## 2021-08-05 PROCEDURE — 6360000002 HC RX W HCPCS: Performed by: INTERNAL MEDICINE

## 2021-08-05 PROCEDURE — 36415 COLL VENOUS BLD VENIPUNCTURE: CPT

## 2021-08-05 PROCEDURE — 99223 1ST HOSP IP/OBS HIGH 75: CPT | Performed by: SURGERY

## 2021-08-05 PROCEDURE — 93010 ELECTROCARDIOGRAM REPORT: CPT | Performed by: INTERNAL MEDICINE

## 2021-08-05 PROCEDURE — 1200000000 HC SEMI PRIVATE

## 2021-08-05 PROCEDURE — 80053 COMPREHEN METABOLIC PANEL: CPT

## 2021-08-05 PROCEDURE — 6370000000 HC RX 637 (ALT 250 FOR IP): Performed by: INTERNAL MEDICINE

## 2021-08-05 PROCEDURE — 83690 ASSAY OF LIPASE: CPT

## 2021-08-05 PROCEDURE — 2500000003 HC RX 250 WO HCPCS: Performed by: INTERNAL MEDICINE

## 2021-08-05 PROCEDURE — 85027 COMPLETE CBC AUTOMATED: CPT

## 2021-08-05 RX ADMIN — INSULIN GLARGINE 25 UNITS: 100 INJECTION, SOLUTION SUBCUTANEOUS at 09:00

## 2021-08-05 RX ADMIN — POLYETHYLENE GLYCOL 3350 17 G: 17 POWDER, FOR SOLUTION ORAL at 08:47

## 2021-08-05 RX ADMIN — ACETAMINOPHEN 500 MG: 500 TABLET, FILM COATED ORAL at 14:22

## 2021-08-05 RX ADMIN — TORSEMIDE 60 MG: 20 TABLET ORAL at 08:47

## 2021-08-05 RX ADMIN — Medication 400 UNITS: at 08:47

## 2021-08-05 RX ADMIN — CETIRIZINE HYDROCHLORIDE 5 MG: 10 TABLET, FILM COATED ORAL at 08:46

## 2021-08-05 RX ADMIN — STANDARDIZED SENNA CONCENTRATE AND DOCUSATE SODIUM 1 TABLET: 8.6; 5 TABLET ORAL at 08:46

## 2021-08-05 RX ADMIN — ALLOPURINOL 100 MG: 100 TABLET ORAL at 08:46

## 2021-08-05 RX ADMIN — Medication 1 TABLET: at 08:46

## 2021-08-05 RX ADMIN — ATORVASTATIN CALCIUM 10 MG: 40 TABLET, FILM COATED ORAL at 08:46

## 2021-08-05 RX ADMIN — MICONAZOLE NITRATE: 20 POWDER TOPICAL at 08:47

## 2021-08-05 RX ADMIN — MICONAZOLE NITRATE: 20 POWDER TOPICAL at 23:32

## 2021-08-05 RX ADMIN — ASPIRIN 81 MG: 81 TABLET, CHEWABLE ORAL at 08:47

## 2021-08-05 RX ADMIN — ACETAMINOPHEN 500 MG: 500 TABLET, FILM COATED ORAL at 22:47

## 2021-08-05 RX ADMIN — PANTOPRAZOLE SODIUM 40 MG: 40 TABLET, DELAYED RELEASE ORAL at 08:59

## 2021-08-05 RX ADMIN — ACETAMINOPHEN 500 MG: 500 TABLET, FILM COATED ORAL at 08:46

## 2021-08-05 RX ADMIN — ATORVASTATIN CALCIUM 10 MG: 40 TABLET, FILM COATED ORAL at 22:42

## 2021-08-05 RX ADMIN — TIMOLOL MALEATE 1 DROP: 5 SOLUTION OPHTHALMIC at 08:59

## 2021-08-05 RX ADMIN — AMLODIPINE BESYLATE 10 MG: 5 TABLET ORAL at 08:46

## 2021-08-05 RX ADMIN — Medication 1000 MG: at 22:45

## 2021-08-05 RX ADMIN — TIMOLOL MALEATE 1 DROP: 5 SOLUTION OPHTHALMIC at 22:44

## 2021-08-05 RX ADMIN — DORZOLAMIDE HYDROCHLORIDE 1 DROP: 20 SOLUTION/ DROPS OPHTHALMIC at 08:59

## 2021-08-05 RX ADMIN — DORZOLAMIDE HYDROCHLORIDE 1 DROP: 20 SOLUTION/ DROPS OPHTHALMIC at 22:44

## 2021-08-05 RX ADMIN — Medication 1 CAPSULE: at 08:46

## 2021-08-05 RX ADMIN — BRIMONIDINE TARTRATE 1 DROP: 2 SOLUTION OPHTHALMIC at 08:59

## 2021-08-05 RX ADMIN — BRIMONIDINE TARTRATE 1 DROP: 2 SOLUTION OPHTHALMIC at 22:45

## 2021-08-05 RX ADMIN — ATENOLOL 12.5 MG: 25 TABLET ORAL at 08:47

## 2021-08-05 ASSESSMENT — ENCOUNTER SYMPTOMS
BACK PAIN: 1
APNEA: 0
NAUSEA: 1
EYE ITCHING: 0
CHEST TIGHTNESS: 0
EYE DISCHARGE: 0
COLOR CHANGE: 0
ABDOMINAL PAIN: 1

## 2021-08-05 ASSESSMENT — PAIN DESCRIPTION - PAIN TYPE: TYPE: ACUTE PAIN

## 2021-08-05 ASSESSMENT — PAIN - FUNCTIONAL ASSESSMENT: PAIN_FUNCTIONAL_ASSESSMENT: PREVENTS OR INTERFERES SOME ACTIVE ACTIVITIES AND ADLS

## 2021-08-05 ASSESSMENT — PAIN SCALES - GENERAL
PAINLEVEL_OUTOF10: 4
PAINLEVEL_OUTOF10: 3

## 2021-08-05 ASSESSMENT — PAIN DESCRIPTION - FREQUENCY: FREQUENCY: CONTINUOUS

## 2021-08-05 ASSESSMENT — PAIN DESCRIPTION - DESCRIPTORS: DESCRIPTORS: ACHING

## 2021-08-05 ASSESSMENT — PAIN DESCRIPTION - LOCATION: LOCATION: BACK;ABDOMEN

## 2021-08-05 ASSESSMENT — PAIN DESCRIPTION - ONSET: ONSET: ON-GOING

## 2021-08-05 ASSESSMENT — PAIN DESCRIPTION - ORIENTATION: ORIENTATION: MID

## 2021-08-05 NOTE — CONSULTS
used   Substance and Sexual Activity    Alcohol use: No    Drug use: No    Sexual activity: Not Currently   Other Topics Concern    Not on file   Social History Narrative    Not on file     Social Determinants of Health     Financial Resource Strain:     Difficulty of Paying Living Expenses:    Food Insecurity:     Worried About Running Out of Food in the Last Year:     [address] in the Last Year:    Transportation Needs:     Lack of Transportation (Medical):  Lack of Transportation (Non-Medical):    Physical Activity:     Days of Exercise per Week:     Minutes of Exercise per Session:    Stress:     Feeling of Stress :    Social Connections:     Frequency of Communication with Friends and Family:     Frequency of Social Gatherings with Friends and Family:     Attends [employer]ai Services:     Active Member of Clubs or Organizations:     Attends Club or Organization Meetings:     Marital Status:    Intimate Partner Violence:     Fear of Current or Ex-Partner:     Emotionally Abused:     Physically Abused:     Sexually Abused: Allergies: Allergies   Allergen Reactions    Adhesive Tape      And bandaids cause skin irritation    Simvastatin Other (See Comments)     Muscle weakness on simvastatin       Prior to Admission medications    Medication Sig Start Date End Date Taking? Authorizing Provider   Lactobacillus (ACIDOPHILUS PO) Take 1 tablet by mouth daily   Yes Historical Provider, MD   allopurinol (ZYLOPRIM) 100 MG tablet Take 100 mg by mouth daily   Yes Historical Provider, MD   miconazole (MICOTIN) 2 % powder Apply topically 2 times daily Apply topically 2 times daily.    Yes Historical Provider, MD   aspirin 81 MG chewable tablet Take 81 mg by mouth daily   Yes Historical Provider, MD   atorvastatin (LIPITOR) 10 MG tablet Take 10 mg by mouth daily   Yes Historical Provider, MD   Insulin Glargine (BASAGLAR KWIKPEN SC) Inject 25 Units into the skin daily   Yes Historical Provider, MD   cloNIDine (CATAPRES) 0.1 MG tablet Take 0.1 mg by mouth 2 times daily as needed for High Blood Pressure (SBP >160)   Yes Historical Provider, MD   estradiol (ESTRACE) 0.1 MG/GM vaginal cream Place 2 g vaginally See Admin Instructions Every Monday and Thursday   Yes Historical Provider, MD   polyethylene glycol (GLYCOLAX) 17 GM/SCOOP powder Take 17 g by mouth daily   Yes Historical Provider, MD   hydrocortisone 2.5 % ointment Apply topically daily Apply to BLE rash   Yes Historical Provider, MD   loperamide (IMODIUM A-D) 2 MG tablet Take 2 mg by mouth 4 times daily as needed for Diarrhea   Yes Historical Provider, MD   insulin lispro (HUMALOG) 100 UNIT/ML injection vial Inject 10 Units into the skin 3 times daily (before meals)   Yes Historical Provider, MD   loratadine (CLARITIN) 10 MG tablet Take 10 mg by mouth daily   Yes Historical Provider, MD   amLODIPine (NORVASC) 10 MG tablet Take 10 mg by mouth nightly    Yes Historical Provider, MD   nystatin (MYCOSTATIN) 013310 UNIT/GM powder Apply topically as needed Under breast   Yes Historical Provider, MD   sennosides-docusate sodium (SENOKOT-S) 8.6-50 MG tablet Take 1 tablet by mouth daily   Yes Historical Provider, MD   torsemide (DEMADEX) 20 MG tablet Take 60 mg by mouth daily   Yes Historical Provider, MD   acetaminophen (TYLENOL) 500 MG tablet Take 500 mg by mouth 3 times daily   Yes Historical Provider, MD   atenolol (TENORMIN) 25 MG tablet Take 0.5 tablets by mouth daily  Patient taking differently: Take 12.5 mg by mouth 2 times daily  2/1/18  Yes Eden Tejeda MD   Multiple Vitamins-Minerals (THERAPEUTIC MULTIVITAMIN-MINERALS) tablet Take 1 tablet by mouth daily   Yes Historical Provider, MD   vitamin E 400 UNIT capsule Take 400 Units by mouth daily   Yes Historical Provider, MD   brimonidine (ALPHAGAN P) 0.1 % SOLN Place 1 drop into both eyes 2 times daily    Yes Historical Provider, MD   dorzolamide-timolol (COSOPT) 22.3-6.8 MG/ML ophthalmic solution Place 1 drop into both eyes 2 times daily    Yes Historical Provider, MD   omeprazole (PRILOSEC) 10 MG capsule Take 20 mg by mouth daily. Yes Historical Provider, MD   bisacodyl (DULCOLAX) 10 MG suppository Place 10 mg rectally daily as needed for Constipation    Historical Provider, MD   Calcium 600-400 MG-UNIT CHEW Take 1 tablet by mouth 2 times daily    Historical Provider, MD   magnesium hydroxide (MILK OF MAGNESIA) 400 MG/5ML suspension Take 30 mLs by mouth daily as needed for Constipation    Historical Provider, MD   lisinopril (PRINIVIL;ZESTRIL) 10 MG tablet Take 1 tablet by mouth daily  Patient taking differently: Take 40 mg by mouth daily  2/1/18   Andrew Gerard MD   furosemide (LASIX) 40 MG tablet Take 1 tablet by mouth daily 1/31/18   Andrew Gerard MD   docusate sodium (COLACE, DULCOLAX) 100 MG CAPS Take 100 mg by mouth 2 times daily as needed for Constipation 7/30/17   Queen Gifty MD   calcium carbonate (OSCAL) 500 MG TABS tablet Take 600 mg by mouth daily    Historical Provider, MD   Misc Natural Products (GLUCOSAMINE CHOND COMPLEX/MSM PO) Take by mouth    Historical Provider, MD   docusate sodium (COLACE) 100 MG capsule Take 100 mg by mouth 2 times daily as needed for Constipation    Historical Provider, MD   diclofenac sodium 1 % GEL Apply  topically 2 times daily. Patient taking differently: Apply 4 g topically 4 times daily as needed  9/22/14   Portillo Hunter MD   lidocaine (LIDODERM) 5 % Place 1 patch onto the skin daily. 12 hours on, 12 hours off. 9/22/14   Portillo Hunter MD   NIFEdipine (PROCARDIA XL) 60 MG CR tablet Take 60 mg by mouth daily. Historical Provider, MD   glimepiride (AMARYL) 1 MG tablet Take 2 mg by mouth every morning (before breakfast)     Historical Provider, MD   sitagliptan (JANUVIA) 50 MG tablet Take 50 mg by mouth daily.     Historical Provider, MD       Active Problems:    Abdominal pain  Resolved Problems:    * No resolved hospital problems. *      Blood pressure (!) 142/76, pulse 65, temperature 98 °F (36.7 °C), temperature source Oral, resp. rate 16, height 5' 8\" (1.727 m), weight 269 lb 2.9 oz (122.1 kg), SpO2 94 %. Review of Systems   Constitutional: Negative for activity change, appetite change, chills and unexpected weight change. HENT: Negative for congestion and dental problem. Eyes: Positive for visual disturbance. Negative for discharge and itching. Respiratory: Negative for apnea and chest tightness. Cardiovascular: Negative for chest pain and leg swelling. Gastrointestinal: Positive for abdominal pain and nausea. Endocrine: Negative for cold intolerance and heat intolerance. Genitourinary: Negative for difficulty urinating and dyspareunia. Musculoskeletal: Positive for back pain. Negative for arthralgias. Skin: Negative for color change and pallor. Allergic/Immunologic: Negative for environmental allergies and food allergies. Neurological: Negative for dizziness and facial asymmetry. Hematological: Negative for adenopathy. Does not bruise/bleed easily. Psychiatric/Behavioral: Negative for agitation and behavioral problems. Physical Exam  Constitutional:       Appearance: She is well-developed. HENT:      Head: Normocephalic and atraumatic. Right Ear: External ear normal.      Left Ear: External ear normal.   Eyes:      Conjunctiva/sclera: Conjunctivae normal.   Cardiovascular:      Rate and Rhythm: Normal rate and regular rhythm. Pulmonary:      Effort: Pulmonary effort is normal.      Breath sounds: Normal breath sounds. Abdominal:      General: There is no distension. Palpations: Abdomen is soft. Tenderness: There is no abdominal tenderness. Musculoskeletal:         General: Normal range of motion. Cervical back: Normal range of motion and neck supple. Skin:     General: Skin is warm and dry.    Neurological:      Mental Status: She is alert and oriented to person, place, and time. Psychiatric:         Behavior: Behavior normal.         Assessment:  Pleasant 77-year-old female who presented to the hospital yesterday with a 7 to 10-day history of lower abdominal pain, right upper quadrant/right flank pain and pain between the scapula. The pain has been associated with some nausea. It is better when lying on her left side and worse with movement. There is no current focal right upper quadrant abdominal tenderness on physical examination. Outpatient right upper quadrant ultrasound showed gallstones with borderline gallbladder wall thickening at 3 mm. CAT scan of the abdomen and pelvis shows a contracted gallbladder. The bile duct is dilated on both ultrasound and CAT scan. Liver function tests and lipase are normal.  At this time, it is not obvious whether or not the gallstones are symptomatic and causative of her current symptoms. An alternative diagnosis may be musculoskeletal pain. Plan:  Orders were written for a general diet.   We will continue to follow clinically  If the gallbladder remains a concern, will check a HIDA scan to evaluate for cystic duct obstruction    Yesi Lopez MD  8/5/2021

## 2021-08-05 NOTE — PLAN OF CARE
Problem: Falls - Risk of:  Goal: Will remain free from falls  Description: Will remain free from falls  8/5/2021 1054 by Ottoniel Avila RN  Outcome: Ongoing  8/5/2021 0032 by Leydi Winters RN  Outcome: Ongoing  Goal: Absence of physical injury  Description: Absence of physical injury  8/5/2021 1054 by Ottoniel Avila RN  Outcome: Ongoing  8/5/2021 0032 by Leydi Winters RN  Outcome: Ongoing     Problem: Skin Integrity:  Goal: Will show no infection signs and symptoms  Description: Will show no infection signs and symptoms  8/5/2021 1054 by Ottoniel Avila RN  Outcome: Ongoing  8/5/2021 0032 by Leydi Winters RN  Outcome: Ongoing  Goal: Absence of new skin breakdown  Description: Absence of new skin breakdown  8/5/2021 1054 by Ottoniel Avila RN  Outcome: Ongoing  8/5/2021 0032 by Leydi Winters RN  Outcome: Ongoing     Problem: Pain:  Goal: Pain level will decrease  Description: Pain level will decrease  8/5/2021 1054 by Ottoniel Avila RN  Outcome: Ongoing  8/5/2021 0032 by Leydi Winters RN  Outcome: Ongoing  Goal: Control of acute pain  Description: Control of acute pain  8/5/2021 1054 by Ottoniel Avila RN  Outcome: Ongoing  8/5/2021 0032 by Leydi Winters RN  Outcome: Ongoing  Goal: Control of chronic pain  Description: Control of chronic pain  8/5/2021 0032 by Leydi Winters RN  Outcome: Ongoing

## 2021-08-05 NOTE — CARE COORDINATION
Discharge Planning Assessment  Rn/SW discharge planner met w/patient/family member to discuss reason for admission, current living situation, and potential needs at the time of discharge. Demographics/Insurance verified Yes    Current type of dwelling: LTC at [facility]    Patient from ECF/SW confirmed with:admissions    Level of function/support:needs assist for ADL's    PCP:Grajeda    Transportation at time of d/c (Discussed w/pt/family that on the day of discharge home, tentative time of discharge will be between 10am and noon): needs stretcher transport to return to facility    Tentative discharge plan: Aware that pt is from [facility]-confirmed bed type and hold w/admissions. CM following for dc planning for return to facility-no precert needed to return.     Electronically signed by RICKY Lucas  [phone]
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
## 2021-08-05 NOTE — PROGRESS NOTES
Admission assessment complete. See flow sheet. . Pt admitted from [facility]. Came in with Abd pain for past week. Pt reports US two days ago showed gallstones. GI consulted. NPO at midnight. CXR showed some pulmonary congestion, on RA. Pt is AxO x4. Take pills whole in AS. Legally blind. Purewick in place, urine sent. Pt expressed no other needs at this time. Will continue to educate patient as needed  Fall precautions in place, hourly rounding, call light and belongings in reach, bed in lowest position, wheels locked in place, side rails up x 2, walkways free of clutter    The care plan and education has been reviewed and mutually agreed upon with the patient.

## 2021-08-05 NOTE — PROGRESS NOTES
Pt seen in  ED, admission completed. Pt is alert and oriented x 4. Pt lives at [facility], and is being admitted for abdominal pain. Plan of care updated, all questions answered.

## 2021-08-05 NOTE — ED NOTES
Report given to [facility] RN.   No further questions at this time     Kristen Bullard, MICHELLE  08/04/21 2027

## 2021-08-06 PROBLEM — K80.20 CHOLELITHIASIS: Status: ACTIVE | Noted: 2021-08-06

## 2021-08-06 PROBLEM — H54.8 LEGALLY BLIND: Status: RESOLVED | Noted: 2017-08-24 | Resolved: 2021-08-06

## 2021-08-06 PROBLEM — B96.29 UTI DUE TO EXTENDED-SPECTRUM BETA LACTAMASE (ESBL) PRODUCING ESCHERICHIA COLI: Status: RESOLVED | Noted: 2017-08-24 | Resolved: 2021-08-06

## 2021-08-06 PROBLEM — K86.89 ATROPHY OF PANCREAS: Status: ACTIVE | Noted: 2021-08-06

## 2021-08-06 PROBLEM — K83.8 INTRAHEPATIC BILE DUCT DILATION: Status: ACTIVE | Noted: 2021-08-06

## 2021-08-06 PROBLEM — Z71.89 DIABETES EDUCATION, ENCOUNTER FOR: Status: RESOLVED | Noted: 2017-08-24 | Resolved: 2021-08-06

## 2021-08-06 PROBLEM — L03.115 BILATERAL LOWER LEG CELLULITIS: Status: RESOLVED | Noted: 2018-01-23 | Resolved: 2021-08-06

## 2021-08-06 PROBLEM — Z16.12 UTI DUE TO EXTENDED-SPECTRUM BETA LACTAMASE (ESBL) PRODUCING ESCHERICHIA COLI: Status: RESOLVED | Noted: 2017-08-24 | Resolved: 2021-08-06

## 2021-08-06 PROBLEM — N39.0 UTI DUE TO EXTENDED-SPECTRUM BETA LACTAMASE (ESBL) PRODUCING ESCHERICHIA COLI: Status: RESOLVED | Noted: 2017-08-24 | Resolved: 2021-08-06

## 2021-08-06 PROBLEM — L03.116 BILATERAL LOWER LEG CELLULITIS: Status: RESOLVED | Noted: 2018-01-23 | Resolved: 2021-08-06

## 2021-08-06 PROBLEM — R60.0 BILATERAL LOWER EXTREMITY EDEMA: Status: RESOLVED | Noted: 2018-01-23 | Resolved: 2021-08-06

## 2021-08-06 PROBLEM — R10.10 UPPER ABDOMINAL PAIN: Status: ACTIVE | Noted: 2021-08-04

## 2021-08-06 PROBLEM — E86.0 DEHYDRATION: Status: ACTIVE | Noted: 2021-08-06

## 2021-08-06 LAB
GLUCOSE BLD-MCNC: 187 MG/DL (ref 70–99)
GLUCOSE BLD-MCNC: 190 MG/DL (ref 70–99)
GLUCOSE BLD-MCNC: 230 MG/DL (ref 70–99)
PERFORMED ON: ABNORMAL

## 2021-08-06 PROCEDURE — 99232 SBSQ HOSP IP/OBS MODERATE 35: CPT | Performed by: SURGERY

## 2021-08-06 PROCEDURE — 6360000002 HC RX W HCPCS: Performed by: INTERNAL MEDICINE

## 2021-08-06 PROCEDURE — APPNB30 APP NON BILLABLE TIME 0-30 MINS: Performed by: NURSE PRACTITIONER

## 2021-08-06 PROCEDURE — 6370000000 HC RX 637 (ALT 250 FOR IP): Performed by: INTERNAL MEDICINE

## 2021-08-06 PROCEDURE — 1200000000 HC SEMI PRIVATE

## 2021-08-06 PROCEDURE — APPSS15 APP SPLIT SHARED TIME 0-15 MINUTES: Performed by: NURSE PRACTITIONER

## 2021-08-06 PROCEDURE — 2580000003 HC RX 258: Performed by: INTERNAL MEDICINE

## 2021-08-06 RX ADMIN — Medication 1 CAPSULE: at 13:03

## 2021-08-06 RX ADMIN — HYDROMORPHONE HYDROCHLORIDE 0.5 MG: 1 INJECTION, SOLUTION INTRAMUSCULAR; INTRAVENOUS; SUBCUTANEOUS at 04:14

## 2021-08-06 RX ADMIN — ATORVASTATIN CALCIUM 10 MG: 40 TABLET, FILM COATED ORAL at 13:03

## 2021-08-06 RX ADMIN — INSULIN LISPRO 10 UNITS: 100 INJECTION, SOLUTION INTRAVENOUS; SUBCUTANEOUS at 17:18

## 2021-08-06 RX ADMIN — DORZOLAMIDE HYDROCHLORIDE 1 DROP: 20 SOLUTION/ DROPS OPHTHALMIC at 09:48

## 2021-08-06 RX ADMIN — BRIMONIDINE TARTRATE 1 DROP: 2 SOLUTION OPHTHALMIC at 10:15

## 2021-08-06 RX ADMIN — STANDARDIZED SENNA CONCENTRATE AND DOCUSATE SODIUM 1 TABLET: 8.6; 5 TABLET ORAL at 13:02

## 2021-08-06 RX ADMIN — ALLOPURINOL 100 MG: 100 TABLET ORAL at 13:02

## 2021-08-06 RX ADMIN — ACETAMINOPHEN 500 MG: 500 TABLET, FILM COATED ORAL at 21:01

## 2021-08-06 RX ADMIN — Medication 1000 MG: at 23:15

## 2021-08-06 RX ADMIN — TORSEMIDE 60 MG: 20 TABLET ORAL at 13:02

## 2021-08-06 RX ADMIN — BRIMONIDINE TARTRATE 1 DROP: 2 SOLUTION OPHTHALMIC at 20:59

## 2021-08-06 RX ADMIN — CETIRIZINE HYDROCHLORIDE 5 MG: 10 TABLET, FILM COATED ORAL at 13:03

## 2021-08-06 RX ADMIN — ACETAMINOPHEN 500 MG: 500 TABLET, FILM COATED ORAL at 13:02

## 2021-08-06 RX ADMIN — DORZOLAMIDE HYDROCHLORIDE 1 DROP: 20 SOLUTION/ DROPS OPHTHALMIC at 20:59

## 2021-08-06 RX ADMIN — SODIUM CHLORIDE: 9 INJECTION, SOLUTION INTRAVENOUS at 17:17

## 2021-08-06 RX ADMIN — HYDROMORPHONE HYDROCHLORIDE 0.5 MG: 1 INJECTION, SOLUTION INTRAMUSCULAR; INTRAVENOUS; SUBCUTANEOUS at 23:15

## 2021-08-06 RX ADMIN — ASPIRIN 81 MG: 81 TABLET, CHEWABLE ORAL at 13:02

## 2021-08-06 RX ADMIN — POLYETHYLENE GLYCOL 3350 17 G: 17 POWDER, FOR SOLUTION ORAL at 13:04

## 2021-08-06 RX ADMIN — Medication 400 UNITS: at 13:03

## 2021-08-06 RX ADMIN — MICONAZOLE NITRATE: 20 POWDER TOPICAL at 09:50

## 2021-08-06 RX ADMIN — Medication 1 TABLET: at 13:02

## 2021-08-06 RX ADMIN — ATENOLOL 12.5 MG: 25 TABLET ORAL at 13:04

## 2021-08-06 RX ADMIN — AMLODIPINE BESYLATE 10 MG: 5 TABLET ORAL at 13:02

## 2021-08-06 RX ADMIN — INSULIN GLARGINE 25 UNITS: 100 INJECTION, SOLUTION SUBCUTANEOUS at 12:54

## 2021-08-06 RX ADMIN — HYDROMORPHONE HYDROCHLORIDE 0.5 MG: 1 INJECTION, SOLUTION INTRAMUSCULAR; INTRAVENOUS; SUBCUTANEOUS at 10:22

## 2021-08-06 RX ADMIN — TIMOLOL MALEATE 1 DROP: 5 SOLUTION OPHTHALMIC at 10:17

## 2021-08-06 RX ADMIN — TIMOLOL MALEATE 1 DROP: 5 SOLUTION OPHTHALMIC at 21:00

## 2021-08-06 ASSESSMENT — PAIN SCALES - GENERAL
PAINLEVEL_OUTOF10: 4
PAINLEVEL_OUTOF10: 7
PAINLEVEL_OUTOF10: 7
PAINLEVEL_OUTOF10: 4
PAINLEVEL_OUTOF10: 4
PAINLEVEL_OUTOF10: 7
PAINLEVEL_OUTOF10: 4
PAINLEVEL_OUTOF10: 4

## 2021-08-06 ASSESSMENT — PAIN DESCRIPTION - PAIN TYPE
TYPE: CHRONIC PAIN
TYPE: ACUTE PAIN;CHRONIC PAIN
TYPE: CHRONIC PAIN

## 2021-08-06 ASSESSMENT — PAIN DESCRIPTION - DESCRIPTORS: DESCRIPTORS: ACHING

## 2021-08-06 ASSESSMENT — PAIN DESCRIPTION - LOCATION
LOCATION: BACK

## 2021-08-06 ASSESSMENT — PAIN DESCRIPTION - ORIENTATION
ORIENTATION: RIGHT;UPPER
ORIENTATION: RIGHT;UPPER

## 2021-08-06 ASSESSMENT — PAIN DESCRIPTION - FREQUENCY: FREQUENCY: CONTINUOUS

## 2021-08-06 NOTE — PLAN OF CARE
Problem: Falls - Risk of:  Goal: Will remain free from falls  Description: Will remain free from falls  8/6/2021 1106 by Selwyn eRal RN  Outcome: Ongoing  Note: Pt remains free of falls at this time. Fall precautions in place per protocol. Problem: Skin Integrity:  Goal: Will show no infection signs and symptoms  Description: Will show no infection signs and symptoms  8/6/2021 1106 by Selwyn Real RN  Outcome: Ongoing  Note: Pt with impaired skin integrity. Hygiene/darshana care provided. Purewick and depend changed. Micotin powder to skin folds as ordered. Assisted turning pt onto right side with pillow support. Heels elevated off bed. Problem: Pain:  Goal: Control of chronic pain  Description: Control of chronic pain  8/6/2021 1106 by Selwyn Real RN  Outcome: Ongoing  Note: Pt c/o chronic back pain, 4/10. Pt wincing and voicing pain with movement. Dilaudid given as ordered. Pt turned onto side using pillow support. Pt satisfied with intervention.

## 2021-08-06 NOTE — PROGRESS NOTES
Shift assessment completed. Medications given per MAR. Patient in Alert and oriented. In bed listening to  television. Sable Angle remains in place. Brief checked, dry at this time. Denies any need to pain medication at this time. Pt expressed no other needs at this time. Will continue to educate patient as needed. Fall precautions in place, hourly rounding, call light and belongings in reach, bed in lowest position, wheels locked in place, side rails up x 2  The care plan and education has been reviewed and mutually agreed upon with the patient.

## 2021-08-06 NOTE — PROGRESS NOTES
[facility]        Progress Note    Patient Name: Daniel Tamez  MRN: [id number]  YOB: 1933  Date of Evaluation: 2021    Chief Complaint: Abdominal pain    Subjective:  No acute events overnight  Denies abdominal pain, but complains of middle/low back pain and pain between scapula, worse with movement  No nausea or vomiting, tolerated general diet last night  Passing flatus and stool, reports mild bloating  Resting in bed at this time      Vital Signs:  Patient Vitals for the past 24 hrs:   BP Temp Temp src Pulse Resp SpO2 Weight   21 1232 (!) 147/78 98.6 °F (37 °C) Oral 70 18 98 % --   21 0930 (!) 174/75 98.9 °F (37.2 °C) Oral 69 18 95 % --   21 0400 (!) 146/81 98 °F (36.7 °C) Axillary 60 16 97 % 269 lb 13.5 oz (122.4 kg)   21 2230 (!) 144/84 97.7 °F (36.5 °C) Infrared 60 16 98 % --   21 1712 (!) 149/78 98.2 °F (36.8 °C) Oral 60 16 97 % --      TEMPERATURE HISTORY 24H: Temp (24hrs), Av.3 °F (36.8 °C), Min:97.7 °F (36.5 °C), Max:98.9 °F (37.2 °C)    BLOOD PRESSURE HISTORY: Systolic (86IYI), ZQH:629 , Min:142 , VNS:639    Diastolic (47YAJ), ZZZ:38, Min:74, Max:84      Intake/Output:  I/O last 3 completed shifts: In: 540 [I.V.:540]  Out: 1000 [Urine:1000]  I/O this shift:   In: 480 [P.O.:480]  Out: -   Drain/tube Output:       Physical Exam:  General: awake, alert, oriented to  person, place, time  Cardiovascular:  regular rate and rhythm and no murmur noted  Lungs: clear to auscultation  Abdomen: soft, nontender, mildly distended, bowel sounds normal     Labs:  CBC:    Recent Labs     21  1620 21  0618   WBC 6.2 6.5   HGB 11.5* 10.9*   HCT 35.9* 33.9*    184     BMP:    Recent Labs     210 21  0654    141   K 4.7 4.4    106   CO2 26 21   BUN 37* 37*   CREATININE 1.0 1.0   GLUCOSE 124* 124*     Hepatic:    Recent Labs     210 21  0654   AST 35 21   ALT 12 10   BILITOT 0.3 0.4   ALKPHOS 72 72     Amylase:  No results found for: AMYLASE  Lipase:    Lab Results   Component Value Date    LIPASE 13.0 08/05/2021    LIPASE 20.0 08/04/2021    LIPASE 33.0 09/20/2014      Mag:    Lab Results   Component Value Date    MG 2.00 01/24/2018     Phos:   No results found for: PHOS   Coags:   Lab Results   Component Value Date    PROTIME 10.2 09/20/2014    INR 0.95 09/20/2014       Cultures:  Anaerobic culture  No results found for: LABANAE  Fungus stain  No results found for requested labs within last 30 days. Gram stain  No results found for requested labs within last 30 days. Organism  Lab Results   Component Value Date/Time    ORG Escherichia coli ESBL (A) 01/23/2018 12:00 AM     Surgical culture  No results found for: CXSURG  Blood culture 1  No results found for requested labs within last 30 days. Blood culture 2  No results found for requested labs within last 30 days. Fecal occult  No results found for requested labs within last 30 days. GI bacterial pathogens by PCR  No results found for requested labs within last 30 days. C. difficile  No results found for requested labs within last 30 days. Urine culture  Lab Results   Component Value Date    LABURIN  01/23/2018     >100,000 CFU/ml  CONTACT PRECAUTIONS INDICATED  Carbapenem antibiotics are the best option for infections caused  by ESBL-producing organisms. Other antibiotic classes are  likely to result in treatment failure, even for organisms  demonstrating in vitro susceptibility.          Pathology:  No relevant pathology     Imaging:  I have personally reviewed the following films:    CT ABDOMEN PELVIS W IV CONTRAST Additional Contrast? None    Result Date: 8/4/2021  EXAMINATION: CT OF THE ABDOMEN AND PELVIS WITH CONTRAST 8/4/2021 5:28 pm TECHNIQUE: CT of the abdomen and pelvis was performed with the administration of intravenous contrast. Multiplanar reformatted images are provided for review. Dose modulation, iterative reconstruction, and/or weight based adjustment of the mA/kV was utilized to reduce the radiation dose to as low as reasonably achievable. COMPARISON: Chest x-ray August 4, 2021 HISTORY: ORDERING SYSTEM PROVIDED HISTORY: ruq pain TECHNOLOGIST PROVIDED HISTORY: Reason for exam:->ruq pain Additional Contrast?->None Decision Support Exception - unselect if not a suspected or confirmed emergency medical condition->Emergency Medical Condition (MA) Reason for Exam: Abdominal Pain (Pt. arrived via EMS with c/o abdominal pain, flank pain, and states that she had a US done yesterday at the facility and stated that the tech told her that there are gallstones. Pt. is unsure of how many present) Acuity: Acute Type of Exam: Initial FINDINGS: Lower Chest: Small effusion and atelectasis at the right lung base. Minimal left basilar atelectasis. Calcifications of the aortic and mitral valves. Coronary artery atherosclerotic disease. Small hiatal hernia. Organs: Mild intrahepatic and extrahepatic biliary dilatation. No definite obstructing stone identified within limits of CT. The gallbladder is partially collapsed and not well evaluated. No pericholecystic fluid identified. Several calcified granulomata of the spleen. The pancreas is severely atrophy. Scattered coarse calcifications within the pancreas suggesting sequela of remote pancreatitis. Adrenal glands appear unremarkable. Atrophy of the bilateral kidneys. A few subcentimeter hypodensities are present within the kidneys bilaterally which are too small to accurately characterize. No hydronephrosis. No obstructive uropathy identified. GI/Bowel: No bowel obstruction is evident. Moderate volume of stool throughout the colon. Normal appendix. Small bowel is normal in caliber. Pelvis: Partially calcified right ovary. The uterus is heterogeneous however, not well evaluated on CT.   Ultrasound could be obtained for further evaluation on a nonemergent basis as clinically indicated taking into account patient age and comorbidities. The bladder is collapsed not well evaluated. Peritoneum/Retroperitoneum: The abdominal aorta is normal in caliber with severe atherosclerotic plaque present. No retroperitoneal adenopathy. No free fluid or free air identified within the abdomen and pelvis. Bones/Soft Tissues: Partially imaged postoperative changes of the right femur. The bones are osteopenic. Moderate to severe degenerative change of the bilateral hips, left greater than right. Multilevel moderate to severe degenerative changes of the spine. Ankylosis of the thoracolumbar spine noted. Age-indeterminate mild compression deformity of the L1 vertebral body with approximately 10% height loss. 1. Nonspecific mild intrahepatic and extrahepatic biliary dilatation without definite obstructing stone within limits of CT. Correlate with laboratory values. Dedicated MRCP or ERCP could be obtained for further evaluation as clinically indicated. 2. Small right pleural effusion and associated atelectasis. 3. Severely atrophic pancreas with numerous calcifications present suggesting sequela of remote pancreatitis. 4. Moderate volume of stool throughout the colon with no evidence for bowel obstruction. 5. Heterogeneous appearance of the uterus which is not well evaluated on CT. Ultrasound could be obtained for further evaluation on a nonemergent basis as clinically indicated taking into account patient age and comorbidities. 6. Severe atherosclerotic disease. 7. 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 Approximately 10% height loss. Dedicated MRI of the lumbar spine could be obtained for further evaluation if the patient has acute back pain.      XR CHEST PORTABLE    Result Date: 8/4/2021  EXAMINATION: ONE XRAY VIEW OF THE CHEST 8/4/2021 4:54 pm COMPARISON: 01/23/2018 HISTORY: ORDERING SYSTEM PROVIDED HISTORY: SOB TECHNOLOGIST PROVIDED HISTORY: Reason for exam:->SOB Reason for Exam: SOB Acuity: Unknown Type of Exam: Unknown FINDINGS: The heart is borderline enlarged. The pulmonary vessels are top-normal.  The lungs are hyperinflated with mild linear densities along the lung bases which are less prominent. No consolidation or effusion is seen. Stable borderline cardiomegaly with minimal central pulmonary congestion or pulmonary artery hypertension which is less prominent. Mild discoid atelectasis or scarring along the lung bases which is less prominent. Scheduled Meds:   acetaminophen  500 mg Oral TID    allopurinol  100 mg Oral Daily    amLODIPine  10 mg Oral Daily    aspirin  81 mg Oral Daily    atenolol  12.5 mg Oral Daily    atorvastatin  10 mg Oral Daily    brimonidine  1 drop Both Eyes BID    estradiol  2 g Vaginal Once per day on Mon Thu    insulin glargine  25 Units Subcutaneous Daily    insulin lispro (1 Unit Dial)  10 Units Subcutaneous TID AC    lactobacillus  1 capsule Oral Daily    cetirizine  5 mg Oral Daily    miconazole   Topical BID    therapeutic multivitamin-minerals  1 tablet Oral Daily    pantoprazole  40 mg Oral QAM AC    polyethylene glycol  17 g Oral Daily    sennosides-docusate sodium  1 tablet Oral Daily    torsemide  60 mg Oral Daily    vitamin E  400 Units Oral Daily    cefTRIAXone (ROCEPHIN) IV  1,000 mg Intravenous Q24H    dorzolamide  1 drop Both Eyes BID    And    timolol  1 drop Both Eyes BID     Continuous Infusions:   sodium chloride 30 mL/hr at 08/06/21 0950    dextrose       PRN Meds:.hydrocortisone, HYDROmorphone, ondansetron, glucose, dextrose, glucagon (rDNA), dextrose      Assessment:  80 y.o. female admitted with   1. Upper abdominal pain    2. Dilated intrahepatic bile duct        Abdominal, back, and pain between scapula      Plan:  1.  Suspicion remains low for symptomatic cholelithiasis/cholecystitis, suspect musculoskeletal pain; continue supportive care and symptomatic management--apply heat, no plans for HIDA scan at this time  2. General diet as tolerated  3. Activity as tolerated--PT/OT evaluation and treatment  4. PRN analgesics and antiemetics--minimizing narcotics as tolerated  5. Management of medical comorbid etiologies per primary team and consulting services  6. Disposition: Per primary team    EDUCATION:  Educated patient on plan of care and disease process--all questions answered. Plans discussed with patient and nursing. Reviewed and discussed with Dr. Mora Lainez.       Signed:  NANCY Lorenzo - CNP  8/6/2021 2:31 PM     80year-old female seen in follow-up for cholelithiasis  Doing a little better today  No focal right upper quadrant abdominal tenderness on physical examination  Right-sided chest pain and pain between the scapula is felt to be most likely musculoskeletal  No plans for cholecystectomy at this time  General diet ordered

## 2021-08-06 NOTE — PROGRESS NOTES
Patient Active Problem List   Diagnosis    Chest pain    Generalized weakness    Essential hypertension    GERD (gastroesophageal reflux disease)    Hypercholesterolemia    Primary osteoarthritis involving multiple joints    Spinal stenosis of lumbar region    Encounter for medication counseling    Abdominal pain    Cholelithiasis    Intrahepatic bile duct dilation    Dehydration    Atrophy of pancreas   H&P dictated

## 2021-08-06 NOTE — PROGRESS NOTES
Gastroenterology Progress Note    Edward Arroyo is a 80 y.o. female patient. Active Problems:    Essential hypertension    GERD (gastroesophageal reflux disease)    Hypercholesterolemia    Primary osteoarthritis involving multiple joints    Abdominal pain    Cholelithiasis    Intrahepatic bile duct dilation    Dehydration    Atrophy of pancreas  Resolved Problems:    * No resolved hospital problems. *      SUBJECTIVE:  C/o back pain with movement. Not having abdominal pain currently. ROS:  No fever, chills  No chest pain, palpitations  No SOB, cough  Gastrointestinal ROS: see above    Physical    VITALS:  BP (!) 147/78   Pulse 70   Temp 98.6 °F (37 °C) (Oral)   Resp 18   Ht 5' 8\" (1.727 m)   Wt 269 lb 13.5 oz (122.4 kg)   SpO2 98%   BMI 41.03 kg/m²   TEMPERATURE:  Current - Temp: 98.6 °F (37 °C); Max - Temp  Av.3 °F (36.8 °C)  Min: 97.7 °F (36.5 °C)  Max: 98.9 °F (37.2 °C)    NAD  Regular rate   Lungs CTA Bilaterally  Abdomen soft, ND, NT,  Bowel sounds normal.    Data    Data Review:    Recent Labs     21  0618   WBC 6.2 6.5   HGB 11.5* 10.9*   HCT 35.9* 33.9*   MCV 96.7 97.8    184     Recent Labs     210 21  0654    141   K 4.7 4.4    106   CO2 26 21   BUN 37* 37*   CREATININE 1.0 1.0     Recent Labs     210 21  0654   AST 35 21   ALT 12 10   BILITOT 0.3 0.4   ALKPHOS 72 72     Recent Labs     21  0618   LIPASE 20.0 13.0     No results for input(s): PROTIME, INR in the last 72 hours. No results for input(s): PTT in the last 72 hours. ASSESSMENT :    Back and right upper quadrant pain -Did have some nausea and outpatient ultrasound with gallstones and borderline gallbladder wall thickening. CT with IV contrast here also notes biliary dilation without obvious source. Liver enzymes are normal so biliary dilation may be age-related.  Pain not felt to be from the GB per surgery

## 2021-08-06 NOTE — PLAN OF CARE
Problem: Falls - Risk of:  Goal: Will remain free from falls  Description: Will remain free from falls  Outcome: Ongoing  Goal: Absence of physical injury  Description: Absence of physical injury  Outcome: Ongoing     Problem: Skin Integrity:  Goal: Will show no infection signs and symptoms  Description: Will show no infection signs and symptoms  Outcome: Ongoing  Goal: Absence of new skin breakdown  Description: Absence of new skin breakdown  Outcome: Ongoing     Problem: Pain:  Goal: Pain level will decrease  Description: Pain level will decrease  Outcome: Ongoing  Goal: Control of acute pain  Description: Control of acute pain  Outcome: Ongoing  Goal: Control of chronic pain  Description: Control of chronic pain  Outcome: Ongoing     Pain assessed using 0-10 scale, patient able to voice pain level and states pain improved with prn medication administered. ]    Fall precautions in place, hourly rounding, call light and belongings in reach, bed in lowest position, wheels locked in place, side rails up x 2, walkways free of clutter    Skin remains dry and intact, no signs of breakdown noted. Patient encouraged to reposition every 2 hours and is assisted to do so when unable to reposition independently.

## 2021-08-06 NOTE — H&P
[address]                     [address] [facility], [address]                              HISTORY AND PHYSICAL    PATIENT NAME: Nolan Genao                  :        1933  MED REC NO:   [id number]                          ROOM:       [id number]  ACCOUNT NO:   [de-identified]                           ADMIT DATE: 2021  PROVIDER:     Cliff Reyes MD    HISTORY OF PRESENT ILLNESS:  The patient is an 80-year-old white lady  came to [facility] with history of abdominal pain, nausea  and vomiting. The patient also has a flank pain. The patient also had  a fever. The patient had an ultrasound done recently at the facility  and stated that, that could be gallstones. The patient had right upper  quadrant abdominal pain and right-sided flank pain. Symptoms have been  ongoing for 7-10 days. Initially thought the pain was because of her  muscle relaxer, if she moved around. Ultrasound performed two days ago  showed gallstone present and she continues to have the pain. The  patient is overall a poor historian. By the time I saw her, she did not  complain of much pain. She rated her pain on arrival at 6/10, but  states that she does improve when she lives on her left hand side and  does not move, _____ worse with movement. The patient stated that she  was nauseated for few days. There was no emesis or diarrhea. There was  no fever or chills. There is no cough or shortness of breath. PAST MEDICAL HISTORY:  Pertinent for diabetes mellitus, hypertension,  osteoarthritis, hyperlipidemia, hypothyroidism, lumbar spinal stenosis,  urinary tract infection, gastroesophageal reflux disease, chronic leg  edema, ESBL-producing bacterial infection, gout, CHF. legal blindness. PAST SURGICAL HISTORY:  Pertinent for leg surgery, D and C, varicose  vein surgery and eye surgery.     FAMILY HISTORY:  Both the parents are  because of natural  causes. Mother had diabetes. Father  of some unknown natural  cause. Her paternal aunt had cancer. Her paternal uncle also had some  kind of cancer. MEDICATIONS:  Acetaminophen, allopurinol, amlodipine, aspirin, atenolol,  atorvastatin, bisacodyl, brimonidine, calcium carbonate, clonidine,  diclofenac gel, docusate, dorzolamide ophthalmic solution, estradiol,  Lasix, glimepiride, hydrocortisone cream, insulin glargine, insulin  lispro, lactobacillus acidophilus, Lidoderm, lisinopril, loperamide,  loratadine, milk of magnesia, miconazole cream and powder, glucosamine  chondroitin, multivitamin, nifedipine, omeprazole, MiraLAX powder,  Senokot, Januvia, torsemide and vitamin E.    ALLERGIES:  The patient is allergic to adhesive tape and simvastatin. SOCIAL HISTORY:  The patient is a  woman. She was  once  and then remarried and that  is dead now. She had two children,  there is only one living. She was a nonsmoker, no drinking. She was a   for [employer]. There is no history of substance  abuse. REVIEW OF SYSTEMS:  Negative for loss of consciousness. No dizziness. No convulsions. No visual blurring. No dysphagia. No angina pectoris. Does have exertional shortness of breath. Does have moderate abdominal  pain and nausea. No emesis. No hematemesis. No melena. No  genitourinary complaint. Does have chronic musculoskeletal pain. No  jaundice. No ascites. PHYSICAL EXAMINATION:  GENERAL:  Alert, awake and oriented x3 80year-old fairly pleasant and  cooperative white woman looking consistent or somewhat younger than her  stated age. VITAL SIGNS:  Her temperature is 97.6, blood pressure 150/73,  respirations 16, heart rate 94. O2 sat 94% on 3 L. HEENT:  Oral mucosa dry. Sclerae anicteric. No jaundice. SKIN:  Warm and dry. NECK:  Supple. Faint carotid bruit. No jugular venous distention. ABDOMEN:  Soft.   There is slight right upper quadrant tenderness with  positive Cota sign. No guarding, rebound, rigidity. EXTREMITIES:  Shows no cyanosis or edema. Distal pulsations are  present. NEUROLOGIC:  The patient appears grossly intact. LABORATORY DATA:  Lab evaluation shows sodium 141, potassium 4.4,  chloride 106, CO2 21, BUN 37, creatinine 1.0, anion gap is 14, calcium  is 9.3. Blood sugar is 209. Albumin 3.3, globulin 3.5, alkaline  phosphatase 72, AST and ALT 21 and 10, total protein 6.8. White blood  cell count 6.5, hemoglobin/hematocrit is 10.9 and 33.9, platelet count  is 273. DIAGNOSTIC DATA:  CT scan of the abdomen and pelvis shows nonspecific  mild intrahepatic and extrahepatic biliary dilatation without definite  obstructing stone within the limits of CT. Dedicated ERCP or MRCP has  been recommended. Right-sided pleural effusion with associated  atelectasis, severely atrophic pancreas with numerous calcification,  moderate volume of stool throughout the colon without any evidence of  bowel obstruction. Heterogeneous appearance of the uterus which is not  well evaluated on CT  Severe atherosclerotic heart disease, ankylosis of  the thoracolumbar spine with severe osteopenia and age indeterminate  mild compression deformity of the L1 vertebral body. Dedicated MRI of  the lumbar spine could be obtained for further evaluation. ASSESSMENT:  Cholelithiasis, extrahepatic biliary dilatation, atrophic  chronic pancreatitis, pleural effusion, fecal impaction, thoracolumbar  ankylosis of the spine, severe osteopenia. PLAN:  Get her admitted. Treat her with IV hydration, IV ceftriaxone,  parenteral analgesics and antiemetic for abdominal pain. We will also  get a GI consult per Dr. Ailyn Love for consideration of ERCP. As always, it is a pleasure to take care of your patients at [facility], Dr. Darnell Vásquez.         Sujatha Powell MD    D: 08/06/2021 1:05:40       T: 08/06/2021 1:09:42 [id number]  Job#: [id number]     Doc#: [id number]    CC:  Shiloh Stoddard

## 2021-08-06 NOTE — PROGRESS NOTES
Pt NPO d/t HIDA scan. Attempted call to nuclear medicine inquiring on time. Awaiting call back. Pt educated on POC.

## 2021-08-07 ENCOUNTER — APPOINTMENT (OUTPATIENT)
Dept: MRI IMAGING | Age: 86
DRG: 478 | End: 2021-08-07
Payer: MEDICAID

## 2021-08-07 LAB
GLUCOSE BLD-MCNC: 136 MG/DL (ref 70–99)
GLUCOSE BLD-MCNC: 161 MG/DL (ref 70–99)
GLUCOSE BLD-MCNC: 191 MG/DL (ref 70–99)
GLUCOSE BLD-MCNC: 194 MG/DL (ref 70–99)
PERFORMED ON: ABNORMAL

## 2021-08-07 PROCEDURE — 6360000002 HC RX W HCPCS: Performed by: INTERNAL MEDICINE

## 2021-08-07 PROCEDURE — 1200000000 HC SEMI PRIVATE

## 2021-08-07 PROCEDURE — APPNB30 APP NON BILLABLE TIME 0-30 MINS: Performed by: NURSE PRACTITIONER

## 2021-08-07 PROCEDURE — 72148 MRI LUMBAR SPINE W/O DYE: CPT

## 2021-08-07 PROCEDURE — 2500000003 HC RX 250 WO HCPCS: Performed by: INTERNAL MEDICINE

## 2021-08-07 PROCEDURE — 99232 SBSQ HOSP IP/OBS MODERATE 35: CPT | Performed by: SURGERY

## 2021-08-07 PROCEDURE — 2580000003 HC RX 258: Performed by: INTERNAL MEDICINE

## 2021-08-07 PROCEDURE — 6370000000 HC RX 637 (ALT 250 FOR IP): Performed by: INTERNAL MEDICINE

## 2021-08-07 PROCEDURE — APPSS15 APP SPLIT SHARED TIME 0-15 MINUTES: Performed by: NURSE PRACTITIONER

## 2021-08-07 RX ORDER — MAGNESIUM CARB/ALUMINUM HYDROX 105-160MG
30 TABLET,CHEWABLE ORAL ONCE
Status: DISCONTINUED | OUTPATIENT
Start: 2021-08-07 | End: 2021-08-11 | Stop reason: HOSPADM

## 2021-08-07 RX ORDER — BISACODYL 10 MG
10 SUPPOSITORY, RECTAL RECTAL ONCE
Status: DISCONTINUED | OUTPATIENT
Start: 2021-08-07 | End: 2021-08-11 | Stop reason: HOSPADM

## 2021-08-07 RX ADMIN — HYDROMORPHONE HYDROCHLORIDE 0.5 MG: 1 INJECTION, SOLUTION INTRAMUSCULAR; INTRAVENOUS; SUBCUTANEOUS at 20:11

## 2021-08-07 RX ADMIN — SODIUM CHLORIDE: 9 INJECTION, SOLUTION INTRAVENOUS at 22:33

## 2021-08-07 RX ADMIN — Medication 400 UNITS: at 09:57

## 2021-08-07 RX ADMIN — PANTOPRAZOLE SODIUM 40 MG: 40 TABLET, DELAYED RELEASE ORAL at 06:39

## 2021-08-07 RX ADMIN — AMLODIPINE BESYLATE 10 MG: 5 TABLET ORAL at 09:58

## 2021-08-07 RX ADMIN — ALLOPURINOL 100 MG: 100 TABLET ORAL at 09:57

## 2021-08-07 RX ADMIN — MICONAZOLE NITRATE: 20 POWDER TOPICAL at 20:16

## 2021-08-07 RX ADMIN — DORZOLAMIDE HYDROCHLORIDE 1 DROP: 20 SOLUTION/ DROPS OPHTHALMIC at 10:06

## 2021-08-07 RX ADMIN — INSULIN LISPRO 10 UNITS: 100 INJECTION, SOLUTION INTRAVENOUS; SUBCUTANEOUS at 18:03

## 2021-08-07 RX ADMIN — POLYETHYLENE GLYCOL 3350 17 G: 17 POWDER, FOR SOLUTION ORAL at 09:57

## 2021-08-07 RX ADMIN — DORZOLAMIDE HYDROCHLORIDE 1 DROP: 20 SOLUTION/ DROPS OPHTHALMIC at 20:13

## 2021-08-07 RX ADMIN — STANDARDIZED SENNA CONCENTRATE AND DOCUSATE SODIUM 1 TABLET: 8.6; 5 TABLET ORAL at 09:57

## 2021-08-07 RX ADMIN — Medication 1 CAPSULE: at 09:57

## 2021-08-07 RX ADMIN — TIMOLOL MALEATE 1 DROP: 5 SOLUTION OPHTHALMIC at 20:13

## 2021-08-07 RX ADMIN — TIMOLOL MALEATE 1 DROP: 5 SOLUTION OPHTHALMIC at 10:06

## 2021-08-07 RX ADMIN — INSULIN LISPRO 10 UNITS: 100 INJECTION, SOLUTION INTRAVENOUS; SUBCUTANEOUS at 13:44

## 2021-08-07 RX ADMIN — Medication 1 TABLET: at 09:57

## 2021-08-07 RX ADMIN — BRIMONIDINE TARTRATE 1 DROP: 2 SOLUTION OPHTHALMIC at 20:13

## 2021-08-07 RX ADMIN — BRIMONIDINE TARTRATE 1 DROP: 2 SOLUTION OPHTHALMIC at 10:06

## 2021-08-07 RX ADMIN — ACETAMINOPHEN 500 MG: 500 TABLET, FILM COATED ORAL at 09:57

## 2021-08-07 RX ADMIN — Medication 1000 MG: at 20:10

## 2021-08-07 RX ADMIN — MICONAZOLE NITRATE: 20 POWDER TOPICAL at 10:06

## 2021-08-07 RX ADMIN — ATORVASTATIN CALCIUM 10 MG: 40 TABLET, FILM COATED ORAL at 09:57

## 2021-08-07 RX ADMIN — ONDANSETRON 4 MG: 2 INJECTION INTRAMUSCULAR; INTRAVENOUS at 20:11

## 2021-08-07 RX ADMIN — ACETAMINOPHEN 500 MG: 500 TABLET, FILM COATED ORAL at 15:17

## 2021-08-07 RX ADMIN — ATENOLOL 12.5 MG: 25 TABLET ORAL at 10:05

## 2021-08-07 RX ADMIN — CETIRIZINE HYDROCHLORIDE 5 MG: 10 TABLET, FILM COATED ORAL at 09:57

## 2021-08-07 RX ADMIN — ACETAMINOPHEN 500 MG: 500 TABLET, FILM COATED ORAL at 20:10

## 2021-08-07 RX ADMIN — TORSEMIDE 60 MG: 20 TABLET ORAL at 09:57

## 2021-08-07 RX ADMIN — ASPIRIN 81 MG: 81 TABLET, CHEWABLE ORAL at 09:57

## 2021-08-07 ASSESSMENT — PAIN DESCRIPTION - FREQUENCY: FREQUENCY: CONTINUOUS

## 2021-08-07 ASSESSMENT — PAIN SCALES - GENERAL
PAINLEVEL_OUTOF10: 0
PAINLEVEL_OUTOF10: 3
PAINLEVEL_OUTOF10: 5
PAINLEVEL_OUTOF10: 2
PAINLEVEL_OUTOF10: 2
PAINLEVEL_OUTOF10: 5

## 2021-08-07 ASSESSMENT — PAIN DESCRIPTION - ONSET: ONSET: ON-GOING

## 2021-08-07 ASSESSMENT — PAIN DESCRIPTION - LOCATION: LOCATION: BACK

## 2021-08-07 ASSESSMENT — PAIN DESCRIPTION - DESCRIPTORS: DESCRIPTORS: ACHING

## 2021-08-07 ASSESSMENT — PAIN DESCRIPTION - PAIN TYPE: TYPE: ACUTE PAIN

## 2021-08-07 NOTE — FLOWSHEET NOTE
08/06/21 2045   Assessment   Charting Type Shift assessment   Neurological   Neuro (WDL) X   Level of Consciousness Alert (0)   Orientation Level Oriented X4   Cognition Appropriate judgement; Appropriate safety awareness; Appropriate for developmental age; Appropriate attention/concentration   Language Clear   R Hand  Weak   L Hand  Weak   R Foot Dorsiflexion Weak   L Foot Dorsiflexion Weak   R Foot Plantar Flexion Weak   L Foot Plantar Flexion Weak   Beemer Coma Scale   Eye Opening 4   Best Verbal Response 5   Best Motor Response 6   Beemer Coma Scale Score 15   HEENT   HEENT (WDL) X   Right Eye Impaired vision   Left Eye Blind   Teeth Dentures upper;Dentures lower   Respiratory   Respiratory (WDL) X   Respiratory Pattern Regular   Respiratory Depth Normal   Respiratory Quality/Effort Unlabored   Chest Assessment Chest expansion symmetrical   L Breath Sounds Clear;Diminished   R Breath Sounds Clear;Diminished   Breath Sounds   Right Upper Lobe Diminished   Right Middle Lobe Diminished   Right Lower Lobe Diminished   Left Upper Lobe Diminished   Left Lower Lobe Diminished   Cardiac   Cardiac (WDL) X  (CHF)   Cardiac Regularity Regular   Heart Sounds S1, S2   Cardiac Rhythm SB   Rhythm Interpretation   Pulse 58   Cardiac Monitor   Telemetry Monitor On No   Gastrointestinal   Abdominal (WDL) X   Abdomen Inspection Soft;Rounded; Other (Comment)  (hernia)   Tenderness Soft   Last BM (including prior to admit) 08/06/21   RUQ Bowel Sounds Active   LUQ Bowel Sounds Active   RLQ Bowel Sounds Active   LLQ Bowel Sounds Active   Hernia Abdominal  (ventral hernia)   GI Symptoms Constipation;Cramping   Peripheral Vascular   Peripheral Vascular (WDL) X   Edema Generalized;Right lower extremity; Left lower extremity   Edema Generalized Trace   RLE Edema Pitting;+2   LLE Edema Pitting;+2   Skin Color/Condition   Skin Color/Condition (WDL) WDL   Skin Color Appropriate for ethnicity   Skin Integrity   Skin Integrity (WDL) X Skin Integrity Redness   Musculoskeletal   Musculoskeletal (WDL) X  (back pain )   RUE Limited movement   LUE Limited movement   RL Extremity Limited movement   LL Extremity Limited movement   Genitourinary   Genitourinary (WDL) X   Flank Tenderness No   Suprapubic Tenderness No   Anus/Rectum   Anus/Rectum (WDL) WDL   Psychosocial   Psychosocial (WDL) WDL

## 2021-08-07 NOTE — PROGRESS NOTES
[facility]        Progress Note    Patient Name: Jetty Apley  MRN: [id number]  YOB: 1933  Date of Evaluation: 2021    Chief Complaint: Abdominal pain    Subjective:  No acute events overnight  Denies abdominal pain at present, did have some last night but seemed to be associated with need to pass stool and unable  Pain remains in middle/low back pain and pain between scapula, worse with movement  No nausea or vomiting, tolerated general diet  Passing flatus  Resting in bed at this time      Vital Signs:  Patient Vitals for the past 24 hrs:   BP Temp Temp src Pulse Resp SpO2   21 0945 (!) 183/86 97.9 °F (36.6 °C) Oral 73 20 99 %   21 0413 (!) 151/73 98.7 °F (37.1 °C) Oral 72 18 99 %   21 2314 (!) 150/59 98.2 °F (36.8 °C) Oral 59 16 99 %   21 2045 (!) 133/57 98.2 °F (36.8 °C) Oral 58 18 99 %   21 1232 (!) 147/78 98.6 °F (37 °C) Oral 70 18 98 %      TEMPERATURE HISTORY 24H: Temp (24hrs), Av.3 °F (36.8 °C), Min:97.9 °F (36.6 °C), Max:98.7 °F (37.1 °C)    BLOOD PRESSURE HISTORY: Systolic (03ZYJ), NIV:994 , Min:133 , NYW:723    Diastolic (97BDV), AFY:04, Min:57, Max:86      Intake/Output:  I/O last 3 completed shifts:   In: 1751.5 [P.O.:960; I.V.:791.5]  Out: 1300 [Urine:1300]  I/O this shift:  In: 240 [P.O.:240]  Out: -   Drain/tube Output:       Physical Exam:  General: awake, alert, oriented to  person, place, time  Cardiovascular:  regular rate and rhythm and no murmur noted  Lungs: clear to auscultation  Abdomen: soft, nontender, mildly distended, bowel sounds normal     Labs:  CBC:    Recent Labs     21  0618   WBC 6.2 6.5   HGB 11.5* 10.9*   HCT 35.9* 33.9*    184     BMP:    Recent Labs     21  0654    141   K 4.7 4.4    106   CO2 26 21   BUN 37* 37*   CREATININE 1.0 1.0   GLUCOSE 124* 124*     Hepatic:    Recent Labs     21  0654 AST 35 21   ALT 12 10   BILITOT 0.3 0.4   ALKPHOS 72 72     Amylase:  No results found for: AMYLASE  Lipase:    Lab Results   Component Value Date    LIPASE 13.0 08/05/2021    LIPASE 20.0 08/04/2021    LIPASE 33.0 09/20/2014      Mag:    Lab Results   Component Value Date    MG 2.00 01/24/2018     Phos:   No results found for: PHOS   Coags:   Lab Results   Component Value Date    PROTIME 10.2 09/20/2014    INR 0.95 09/20/2014       Cultures:  Anaerobic culture  No results found for: LABANAE  Fungus stain  No results found for requested labs within last 30 days. Gram stain  No results found for requested labs within last 30 days. Organism  Lab Results   Component Value Date/Time    ORG Escherichia coli ESBL (A) 01/23/2018 12:00 AM     Surgical culture  No results found for: CXSURG  Blood culture 1  No results found for requested labs within last 30 days. Blood culture 2  No results found for requested labs within last 30 days. Fecal occult  No results found for requested labs within last 30 days. GI bacterial pathogens by PCR  No results found for requested labs within last 30 days. C. difficile  No results found for requested labs within last 30 days. Urine culture  Lab Results   Component Value Date    LABURIN  01/23/2018     >100,000 CFU/ml  CONTACT PRECAUTIONS INDICATED  Carbapenem antibiotics are the best option for infections caused  by ESBL-producing organisms. Other antibiotic classes are  likely to result in treatment failure, even for organisms  demonstrating in vitro susceptibility. Pathology:  No relevant pathology     Imaging:  I have personally reviewed the following films:    No results found.     Scheduled Meds:   mineral oil  30 mL Oral Once    bisacodyl  10 mg Rectal Once    acetaminophen  500 mg Oral TID    allopurinol  100 mg Oral Daily    amLODIPine  10 mg Oral Daily    aspirin  81 mg Oral Daily    atenolol  12.5 mg Oral Daily    atorvastatin  10 mg Oral Daily    brimonidine  1 drop Both Eyes BID    estradiol  2 g Vaginal Once per day on Mon Thu    insulin glargine  25 Units Subcutaneous Daily    insulin lispro (1 Unit Dial)  10 Units Subcutaneous TID AC    lactobacillus  1 capsule Oral Daily    cetirizine  5 mg Oral Daily    miconazole   Topical BID    therapeutic multivitamin-minerals  1 tablet Oral Daily    pantoprazole  40 mg Oral QAM AC    polyethylene glycol  17 g Oral Daily    sennosides-docusate sodium  1 tablet Oral Daily    torsemide  60 mg Oral Daily    vitamin E  400 Units Oral Daily    cefTRIAXone (ROCEPHIN) IV  1,000 mg Intravenous Q24H    dorzolamide  1 drop Both Eyes BID    And    timolol  1 drop Both Eyes BID     Continuous Infusions:   sodium chloride 30 mL/hr at 08/06/21 1717    dextrose       PRN Meds:.hydrocortisone, HYDROmorphone, ondansetron, glucose, dextrose, glucagon (rDNA), dextrose      Assessment:  80 y.o. female admitted with   1. Upper abdominal pain    2. Dilated intrahepatic bile duct        Abdominal, back, and pain between scapula  Constipation      Plan:  1. Suspicion remains low for symptomatic cholelithiasis/cholecystitis, suspect musculoskeletal pain; continue supportive care and symptomatic management--apply heat  2. General diet as tolerated; monitor bowel function--add laxatives for constipation  3. Activity as tolerated--PT/OT evaluation and treatment  4. PRN analgesics and antiemetics--minimizing narcotics as tolerated  5. Management of medical comorbid etiologies per primary team and consulting services  6. Disposition: Per primary team; home when pain controlled    EDUCATION:  Educated patient on plan of care and disease process--all questions answered. Plans discussed with patient and nursing. Reviewed and discussed with Dr. Ninette Lesch. Signed:  NANCY Young CNP  8/7/2021 10:08 AM     I have personally performed a face to face diagnostic evaluation on this patient.   I have interviewed and examined the patient and I agree with the assessment above. In summary, my findings and plan are the following:   Ms. Steff Cano is a 80 y.o. female who presents with   Abdominal, back, and pain between scapula  Constipation    Plan:  1. Abdominal pain likely related to constipation, will increase bowel regimen, back pain contributing, suspicion for symptomatic cholelithiasis is low and do not recommend cholecystectomy  2. Tolerating diet  3. Activity as tolerated  4. Pain control, minimize narcotics  5. Defer management of remainder of medical comorbidities to primary and consulting teams  6. Discharge planning, may discharge when medically stable    Kain Casillas MD, FACS  8/7/2021  10:15 AM

## 2021-08-08 LAB
A/G RATIO: 1.3 (ref 1.1–2.2)
ALBUMIN SERPL-MCNC: 3.5 G/DL (ref 3.4–5)
ALP BLD-CCNC: 78 U/L (ref 40–129)
ALT SERPL-CCNC: 12 U/L (ref 10–40)
ANION GAP SERPL CALCULATED.3IONS-SCNC: 12 MMOL/L (ref 3–16)
AST SERPL-CCNC: 26 U/L (ref 15–37)
BASOPHILS ABSOLUTE: 0 K/UL (ref 0–0.2)
BASOPHILS RELATIVE PERCENT: 0.4 %
BILIRUB SERPL-MCNC: 0.4 MG/DL (ref 0–1)
BUN BLDV-MCNC: 32 MG/DL (ref 7–20)
CALCIUM SERPL-MCNC: 9.3 MG/DL (ref 8.3–10.6)
CHLORIDE BLD-SCNC: 108 MMOL/L (ref 99–110)
CO2: 26 MMOL/L (ref 21–32)
CREAT SERPL-MCNC: 0.9 MG/DL (ref 0.6–1.2)
EOSINOPHILS ABSOLUTE: 0.3 K/UL (ref 0–0.6)
EOSINOPHILS RELATIVE PERCENT: 4.7 %
GFR AFRICAN AMERICAN: >60
GFR NON-AFRICAN AMERICAN: 59
GLOBULIN: 2.8 G/DL
GLUCOSE BLD-MCNC: 112 MG/DL (ref 70–99)
GLUCOSE BLD-MCNC: 115 MG/DL (ref 70–99)
GLUCOSE BLD-MCNC: 149 MG/DL (ref 70–99)
GLUCOSE BLD-MCNC: 162 MG/DL (ref 70–99)
GLUCOSE BLD-MCNC: 164 MG/DL (ref 70–99)
HCT VFR BLD CALC: 33.7 % (ref 36–48)
HEMOGLOBIN: 11 G/DL (ref 12–16)
LYMPHOCYTES ABSOLUTE: 2 K/UL (ref 1–5.1)
LYMPHOCYTES RELATIVE PERCENT: 29.4 %
MCH RBC QN AUTO: 31.3 PG (ref 26–34)
MCHC RBC AUTO-ENTMCNC: 32.5 G/DL (ref 31–36)
MCV RBC AUTO: 96.4 FL (ref 80–100)
MONOCYTES ABSOLUTE: 0.5 K/UL (ref 0–1.3)
MONOCYTES RELATIVE PERCENT: 7.1 %
NEUTROPHILS ABSOLUTE: 4.1 K/UL (ref 1.7–7.7)
NEUTROPHILS RELATIVE PERCENT: 58.4 %
PDW BLD-RTO: 15.9 % (ref 12.4–15.4)
PERFORMED ON: ABNORMAL
PLATELET # BLD: 189 K/UL (ref 135–450)
PMV BLD AUTO: 8.9 FL (ref 5–10.5)
POTASSIUM SERPL-SCNC: 3.5 MMOL/L (ref 3.5–5.1)
RBC # BLD: 3.5 M/UL (ref 4–5.2)
SODIUM BLD-SCNC: 146 MMOL/L (ref 136–145)
TOTAL PROTEIN: 6.3 G/DL (ref 6.4–8.2)
WBC # BLD: 6.9 K/UL (ref 4–11)

## 2021-08-08 PROCEDURE — 1200000000 HC SEMI PRIVATE

## 2021-08-08 PROCEDURE — 85025 COMPLETE CBC W/AUTO DIFF WBC: CPT

## 2021-08-08 PROCEDURE — 6370000000 HC RX 637 (ALT 250 FOR IP): Performed by: INTERNAL MEDICINE

## 2021-08-08 PROCEDURE — 99232 SBSQ HOSP IP/OBS MODERATE 35: CPT | Performed by: SURGERY

## 2021-08-08 PROCEDURE — APPSS15 APP SPLIT SHARED TIME 0-15 MINUTES: Performed by: NURSE PRACTITIONER

## 2021-08-08 PROCEDURE — 80053 COMPREHEN METABOLIC PANEL: CPT

## 2021-08-08 PROCEDURE — APPNB30 APP NON BILLABLE TIME 0-30 MINS: Performed by: NURSE PRACTITIONER

## 2021-08-08 PROCEDURE — 2500000003 HC RX 250 WO HCPCS: Performed by: INTERNAL MEDICINE

## 2021-08-08 PROCEDURE — 6360000002 HC RX W HCPCS: Performed by: INTERNAL MEDICINE

## 2021-08-08 PROCEDURE — 36415 COLL VENOUS BLD VENIPUNCTURE: CPT

## 2021-08-08 RX ADMIN — INSULIN LISPRO 10 UNITS: 100 INJECTION, SOLUTION INTRAVENOUS; SUBCUTANEOUS at 10:57

## 2021-08-08 RX ADMIN — DORZOLAMIDE HYDROCHLORIDE 1 DROP: 20 SOLUTION/ DROPS OPHTHALMIC at 21:03

## 2021-08-08 RX ADMIN — INSULIN LISPRO 10 UNITS: 100 INJECTION, SOLUTION INTRAVENOUS; SUBCUTANEOUS at 16:28

## 2021-08-08 RX ADMIN — Medication 1 TABLET: at 10:51

## 2021-08-08 RX ADMIN — ACETAMINOPHEN 500 MG: 500 TABLET, FILM COATED ORAL at 14:59

## 2021-08-08 RX ADMIN — ASPIRIN 81 MG: 81 TABLET, CHEWABLE ORAL at 10:52

## 2021-08-08 RX ADMIN — ATORVASTATIN CALCIUM 10 MG: 40 TABLET, FILM COATED ORAL at 10:51

## 2021-08-08 RX ADMIN — MICONAZOLE NITRATE: 20 POWDER TOPICAL at 10:53

## 2021-08-08 RX ADMIN — BRIMONIDINE TARTRATE 1 DROP: 2 SOLUTION OPHTHALMIC at 21:03

## 2021-08-08 RX ADMIN — Medication 1000 MG: at 21:03

## 2021-08-08 RX ADMIN — CETIRIZINE HYDROCHLORIDE 5 MG: 10 TABLET, FILM COATED ORAL at 10:51

## 2021-08-08 RX ADMIN — MICONAZOLE NITRATE: 20 POWDER TOPICAL at 22:00

## 2021-08-08 RX ADMIN — ACETAMINOPHEN 500 MG: 500 TABLET, FILM COATED ORAL at 10:51

## 2021-08-08 RX ADMIN — PANTOPRAZOLE SODIUM 40 MG: 40 TABLET, DELAYED RELEASE ORAL at 10:52

## 2021-08-08 RX ADMIN — PANTOPRAZOLE SODIUM 40 MG: 40 TABLET, DELAYED RELEASE ORAL at 05:16

## 2021-08-08 RX ADMIN — ACETAMINOPHEN 500 MG: 500 TABLET, FILM COATED ORAL at 21:02

## 2021-08-08 RX ADMIN — TORSEMIDE 60 MG: 20 TABLET ORAL at 10:52

## 2021-08-08 RX ADMIN — ATENOLOL 12.5 MG: 25 TABLET ORAL at 10:51

## 2021-08-08 RX ADMIN — INSULIN GLARGINE 25 UNITS: 100 INJECTION, SOLUTION SUBCUTANEOUS at 10:56

## 2021-08-08 RX ADMIN — STANDARDIZED SENNA CONCENTRATE AND DOCUSATE SODIUM 1 TABLET: 8.6; 5 TABLET ORAL at 10:52

## 2021-08-08 RX ADMIN — Medication 400 UNITS: at 10:52

## 2021-08-08 RX ADMIN — POLYETHYLENE GLYCOL 3350 17 G: 17 POWDER, FOR SOLUTION ORAL at 12:05

## 2021-08-08 RX ADMIN — Medication 1 CAPSULE: at 10:55

## 2021-08-08 RX ADMIN — BRIMONIDINE TARTRATE 1 DROP: 2 SOLUTION OPHTHALMIC at 10:54

## 2021-08-08 RX ADMIN — TIMOLOL MALEATE 1 DROP: 5 SOLUTION OPHTHALMIC at 21:03

## 2021-08-08 RX ADMIN — DORZOLAMIDE HYDROCHLORIDE 1 DROP: 20 SOLUTION/ DROPS OPHTHALMIC at 10:54

## 2021-08-08 RX ADMIN — ALLOPURINOL 100 MG: 100 TABLET ORAL at 10:51

## 2021-08-08 RX ADMIN — TIMOLOL MALEATE 1 DROP: 5 SOLUTION OPHTHALMIC at 10:54

## 2021-08-08 RX ADMIN — AMLODIPINE BESYLATE 10 MG: 5 TABLET ORAL at 10:55

## 2021-08-08 ASSESSMENT — PAIN - FUNCTIONAL ASSESSMENT: PAIN_FUNCTIONAL_ASSESSMENT: ACTIVITIES ARE NOT PREVENTED

## 2021-08-08 ASSESSMENT — PAIN SCALES - GENERAL
PAINLEVEL_OUTOF10: 2
PAINLEVEL_OUTOF10: 2
PAINLEVEL_OUTOF10: 0
PAINLEVEL_OUTOF10: 0

## 2021-08-08 ASSESSMENT — PAIN DESCRIPTION - DESCRIPTORS: DESCRIPTORS: ACHING

## 2021-08-08 ASSESSMENT — PAIN DESCRIPTION - LOCATION: LOCATION: BACK

## 2021-08-08 ASSESSMENT — PAIN DESCRIPTION - PAIN TYPE: TYPE: ACUTE PAIN

## 2021-08-08 ASSESSMENT — PAIN DESCRIPTION - FREQUENCY: FREQUENCY: CONTINUOUS

## 2021-08-08 NOTE — PROGRESS NOTES
Lab Results   Component Value Date    MG 2.00 01/24/2018     Phos:   No results found for: PHOS   Coags:   Lab Results   Component Value Date    PROTIME 10.2 09/20/2014    INR 0.95 09/20/2014       Cultures:  Anaerobic culture  No results found for: LABANAE  Fungus stain  No results found for requested labs within last 30 days. Gram stain  No results found for requested labs within last 30 days. Organism  Lab Results   Component Value Date/Time    ORG Escherichia coli ESBL (A) 01/23/2018 12:00 AM     Surgical culture  No results found for: CXSURG  Blood culture 1  No results found for requested labs within last 30 days. Blood culture 2  No results found for requested labs within last 30 days. Fecal occult  No results found for requested labs within last 30 days. GI bacterial pathogens by PCR  No results found for requested labs within last 30 days. C. difficile  No results found for requested labs within last 30 days. Urine culture  Lab Results   Component Value Date    LABURIN  01/23/2018     >100,000 CFU/ml  CONTACT PRECAUTIONS INDICATED  Carbapenem antibiotics are the best option for infections caused  by ESBL-producing organisms. Other antibiotic classes are  likely to result in treatment failure, even for organisms  demonstrating in vitro susceptibility. Pathology:  No relevant pathology     Imaging:  I have personally reviewed the following films:    MRI LUMBAR SPINE WO CONTRAST    Result Date: 8/7/2021  EXAMINATION: MRI OF THE LUMBAR SPINE WITHOUT CONTRAST, 8/7/2021 1:59 pm TECHNIQUE: Multiplanar multisequence MRI of the lumbar spine was performed without the administration of intravenous contrast. COMPARISON: None.  HISTORY: ORDERING SYSTEM PROVIDED HISTORY: Intractable back pain TECHNOLOGIST PROVIDED HISTORY: Reason for exam:->Intractable back pain Reason for exam:->Arthritis Reason for Exam: PT STS NO KNOWN INJURY TO LUMBAR SPINE Acuity: Acute Type of Exam: Initial Additional signs and symptoms: PT STS NO KNOWN INJURY TO LUMBAR SPINE Relevant Medical/Surgical History: PT STS NO KNOWN INJURY TO LUMBAR SPINE FINDINGS: BONES/ALIGNMENT: There is a normal lumbar lordosis. There appears to be a subacute to chronic fracture L1 resulting in 25% loss of vertebral body height centrally. Moderate to severe degenerative disc disease is present, worst at the L2-3, L3-4, and L4-5 disc levels. No traumatic subluxation is identified. SPINAL CORD: The conus terminates normally. SOFT TISSUES: No paraspinal mass identified. L1-L2: There is no significant disc herniation, spinal canal stenosis or neural foraminal narrowing. L2-L3: There is no significant disc herniation, spinal canal stenosis or neural foraminal narrowing. L3-L4: There is no significant disc herniation, spinal canal stenosis or neural foraminal narrowing. L4-L5: Disc bulge and facet hypertrophy resulting in mild narrowing of the thecal sac. There is moderate to severe narrowing of the left neural foramen. L5-S1: Ligamentum flavum thickening, facet hypertrophy, and disc bulge resulting in minimal narrowing of the left neural foramen. Likely subacute to chronic fracture of L1.      Scheduled Meds:   mineral oil  30 mL Oral Once    bisacodyl  10 mg Rectal Once    acetaminophen  500 mg Oral TID    allopurinol  100 mg Oral Daily    amLODIPine  10 mg Oral Daily    aspirin  81 mg Oral Daily    atenolol  12.5 mg Oral Daily    atorvastatin  10 mg Oral Daily    brimonidine  1 drop Both Eyes BID    estradiol  2 g Vaginal Once per day on Mon Thu    insulin glargine  25 Units Subcutaneous Daily    insulin lispro (1 Unit Dial)  10 Units Subcutaneous TID AC    lactobacillus  1 capsule Oral Daily    cetirizine  5 mg Oral Daily    miconazole   Topical BID    therapeutic multivitamin-minerals  1 tablet Oral Daily    pantoprazole  40 mg Oral QAM AC    polyethylene glycol  17 g Oral Daily    sennosides-docusate sodium  1 tablet Oral Daily    torsemide  60 mg Oral Daily    vitamin E  400 Units Oral Daily    cefTRIAXone (ROCEPHIN) IV  1,000 mg Intravenous Q24H    dorzolamide  1 drop Both Eyes BID    And    timolol  1 drop Both Eyes BID     Continuous Infusions:   sodium chloride 30 mL/hr at 08/08/21 0518    dextrose       PRN Meds:.hydrocortisone, HYDROmorphone, ondansetron, glucose, dextrose, glucagon (rDNA), dextrose      Assessment:  80 y.o. female admitted with   1. Upper abdominal pain    2. Dilated intrahepatic bile duct        Abdominal, back, and pain between scapula  Constipation      Plan:  1. Suspicion remains low for symptomatic cholelithiasis/cholecystitis, suspect musculoskeletal pain; continue supportive care and symptomatic management--apply heat  2. General diet as tolerated; monitor bowel function--passing stool  3. Activity as tolerated--PT/OT evaluation and treatment  4. PRN analgesics and antiemetics--minimizing narcotics as tolerated  5. Management of medical comorbid etiologies per primary team and consulting services  6. Disposition: Per primary team; home when pain controlled    EDUCATION:  Educated patient on plan of care and disease process--all questions answered. Иван Ryder is stable from surgical standpoint. Will follow as needed. Please call with questions or concerns. Thank you for allowing us to participate in [address]. Plans discussed with patient and nursing. Reviewed and discussed with Dr. Gisella Casillas. Signed:  NANCY Reza - CNP  8/8/2021 10:19 AM     I have personally performed a face to face diagnostic evaluation on this patient. I have interviewed and examined the patient and I agree with the assessment above. In summary, my findings and plan are the following:   Ms. Steff Cano is a 80 y.o. female who presents with   Abdominal, back, and pain between scapula  Constipation     Plan:  1.  Abdominal pain resolved, suspicion for symptomatic cholelithiasis

## 2021-08-08 NOTE — PLAN OF CARE
Shift assessment complete. VSS. Pt is alert and oriented x4. She is pleasant and talkative. Pt is experiencing 5/10 pain in her back at rest. However, when she is moved the pain goes to 10/10. Evening medications administered per MAR. Pt prefers to take PO medications mixed with applesauce. Pt repositioned in bed for comfort. Pur-wick and brief in place. Call light, cell phone, and hospital room phone all within reach. Pt denies needs at this time. No signs of distress. Bed alarm engaged. Pt encouraged to call for assistance.    Problem: Falls - Risk of:  Goal: Will remain free from falls  Description: Will remain free from falls  Outcome: Ongoing  Goal: Absence of physical injury  Description: Absence of physical injury  Outcome: Ongoing     Problem: Skin Integrity:  Goal: Will show no infection signs and symptoms  Description: Will show no infection signs and symptoms  Outcome: Ongoing  Goal: Absence of new skin breakdown  Description: Absence of new skin breakdown  Outcome: Ongoing     Problem: Pain:  Goal: Pain level will decrease  Description: Pain level will decrease  Outcome: Ongoing  Goal: Control of acute pain  Description: Control of acute pain  Outcome: Ongoing  Goal: Control of chronic pain  Description: Control of chronic pain  Outcome: Ongoing

## 2021-08-08 NOTE — PROGRESS NOTES
Department of Internal Medicine  General Internal Medicine   Progress Note      SUBJECTIVE:    C/o moderate back pain  Denies abdominal pain N/V   History obtained from chart review and the patient  General ROS: positive for  - fatigue and malaise  negative for - chills, fever or night sweats  Psychological ROS: negative  Ophthalmic ROS: negative  Respiratory ROS: no cough, shortness of breath, or wheezing  Cardiovascular ROS: no chest pain or dyspnea on exertion  Gastrointestinal ROS: no abdominal pain, change in bowel habits, or black or bloody stools  Genito-Urinary ROS: no dysuria, trouble voiding, or hematuria  Musculoskeletal ROS: negative except severe low back pain   Neurological ROS: no TIA or stroke symptoms  Dermatological ROS: negative    OBJECTIVE      Medications      Current Facility-Administered Medications: mineral oil liquid 30 mL, 30 mL, Oral, Once  bisacodyl (DULCOLAX) suppository 10 mg, 10 mg, Rectal, Once  acetaminophen (TYLENOL) tablet 500 mg, 500 mg, Oral, TID  allopurinol (ZYLOPRIM) tablet 100 mg, 100 mg, Oral, Daily  amLODIPine (NORVASC) tablet 10 mg, 10 mg, Oral, Daily  aspirin chewable tablet 81 mg, 81 mg, Oral, Daily  atenolol (TENORMIN) tablet 12.5 mg, 12.5 mg, Oral, Daily  atorvastatin (LIPITOR) tablet 10 mg, 10 mg, Oral, Daily  brimonidine (ALPHAGAN) 0.2 % ophthalmic solution 1 drop, 1 drop, Both Eyes, BID  estradiol (ESTRACE) vaginal cream 2 g, 2 g, Vaginal, Once per day on Mon Thu  hydrocortisone 2.5 % ointment, , Topical, Daily PRN  insulin glargine (LANTUS;BASAGLAR) injection pen 25 Units, 25 Units, Subcutaneous, Daily  insulin lispro (1 Unit Dial) 10 Units, 10 Units, Subcutaneous, TID AC  lactobacillus (CULTURELLE) capsule 1 capsule, 1 capsule, Oral, Daily  cetirizine (ZYRTEC) tablet 5 mg, 5 mg, Oral, Daily  miconazole (MICOTIN) 2 % powder, , Topical, BID  therapeutic multivitamin-minerals 1 tablet, 1 tablet, Oral, Daily  pantoprazole (PROTONIX) tablet 40 mg, 40 mg, Oral, QAM AC  polyethylene glycol (GLYCOLAX) packet 17 g, 17 g, Oral, Daily  sennosides-docusate sodium (SENOKOT-S) 8.6-50 MG tablet 1 tablet, 1 tablet, Oral, Daily  torsemide (DEMADEX) tablet 60 mg, 60 mg, Oral, Daily  vitamin E capsule 400 Units, 400 Units, Oral, Daily  0.9 % sodium chloride infusion, , Intravenous, Continuous  cefTRIAXone (ROCEPHIN) 1000 mg in sterile water 10 mL IV syringe, 1,000 mg, Intravenous, Q24H  HYDROmorphone HCl PF (DILAUDID) injection 0.5 mg, 0.5 mg, Intravenous, Q4H PRN  ondansetron (ZOFRAN) injection 4 mg, 4 mg, Intravenous, Q6H PRN  dorzolamide (TRUSOPT) 2 % ophthalmic solution 1 drop, 1 drop, Both Eyes, BID **AND** timolol (TIMOPTIC) 0.5 % ophthalmic solution 1 drop, 1 drop, Both Eyes, BID  glucose (GLUTOSE) 40 % oral gel 15 g, 15 g, Oral, PRN  dextrose 50 % IV solution, 12.5 g, Intravenous, PRN  glucagon (rDNA) injection 1 mg, 1 mg, Intramuscular, PRN  dextrose 5 % solution, 100 mL/hr, Intravenous, PRN    Physical      Vitals: BP (!) 149/75   Pulse 66   Temp 97.9 °F (36.6 °C) (Oral)   Resp 16   Ht 5' 8\" (1.727 m)   Wt 269 lb 13.5 oz (122.4 kg)   SpO2 95%   BMI 41.03 kg/m²   Temp: Temp: 97.9 °F (36.6 °C)  Max: Temp  Av °F (36.7 °C)  Min: 97.7 °F (36.5 °C)  Max: 98.3 °F (36.8 °C)  Respiration range:  Resp  Av.8  Min: 16  Max: 18  Pulse Range:  Pulse  Av.2  Min: 57  Max: 66  Blood pressure range:  Systolic (55IFZ), MIH:937 , Min:133 , UTN:341   , Diastolic (71WQB), SRO:89, Min:58, Max:75    SpO2  Av.2 %  Min: 95 %  Max: 100 %    Intake/Output Summary (Last 24 hours) at 2021 1505  Last data filed at 2021 1210  Gross per 24 hour   Intake 1528.83 ml   Output 3200 ml   Net -1671.17 ml       Vent settings:  Pulse  Av.1  Min: 57  Max: 94  Resp  Av  Min: 16  Max: 20  SpO2  Av.1 %  Min: 94 %  Max: 100 %    CONSTITUTIONAL:  fatigued, alert, cooperative, mild distress, appears older than stated age and morbidly obese  EYES:  Unremarkable   NECK:  No JVD and supple, symmetrical, trachea midline  BACK:  Tender  and symmetric  LUNGS:  no increased work of breathing, good air exchange, no retractions and no crackles or wheezing  CARDIOVASCULAR:  normal apical pulses, regular rate and rhythm, normal S1 and S2 and no S3  ABDOMEN:  Soft BS + non tender   MUSCULOSKELETAL:  Trace edema , tenderness Lumbar spine no spasm   NEUROLOGIC:  Awake, alert, oriented to name, place and time. General weakness grossly intact  Lower extremity weakness  , babinski absent . SKIN:  Warm dry and pale  and no bruising or bleeding    Data      Lab Results   Component Value Date    WBC 6.9 08/08/2021    HGB 11.0 (L) 08/08/2021    HCT 33.7 (L) 08/08/2021    MCV 96.4 08/08/2021     08/08/2021     Lab Results   Component Value Date     08/08/2021    K 3.5 08/08/2021    K 3.3 01/24/2018     08/08/2021    CO2 26 08/08/2021    BUN 32 08/08/2021    CREATININE 0.9 08/08/2021    GLUCOSE 112 08/08/2021    CALCIUM 9.3 08/08/2021            ASSESSMENT AND PLAN     Active Problems:    Essential hypertension    GERD (gastroesophageal reflux disease)    Hypercholesterolemia    Primary osteoarthritis involving multiple joints    Upper abdominal pain    Cholelithiasis    Intrahepatic bile duct dilation    Dehydration    Atrophy of pancreas  Resolved Problems:    * No resolved hospital problems.  *       discussed with Dr Kathy Pena in person    no ERCP  Needed for sure , no elevated Alk phos or obstructive  Jaundice , clinical corroboration with surgical team , she is definitely not septic from gall bladder disease

## 2021-08-08 NOTE — PROGRESS NOTES
Pt dentures removed and brushed with cool water and toothpaste. Dentures rinsed and placed in a denture cup on bedside table in cool water. Mouth swabbed with mouthwash.

## 2021-08-08 NOTE — PROGRESS NOTES
NECK:  No JVD  and supple, symmetrical, trachea midline  BACK:  Tender  and symmetric  LUNGS:  no increased work of breathing, good air exchange, no retractions and no crackles or wheezing  CARDIOVASCULAR:  normal apical pulses, regular rate and rhythm, normal S1 and S2 and no S3  ABDOMEN:  Soft BS + non tender   MUSCULOSKELETAL:  Trace edema , tenderness Lumbar spine no spasm   NEUROLOGIC:  Awake, alert, oriented to name, place and time. General weakness grossly intact  Lower extremity weakness  , babinski absent . SKIN:  Warm dry and pale  and no bruising or bleeding    Data      Lab Results   Component Value Date    WBC 6.9 08/08/2021    HGB 11.0 (L) 08/08/2021    HCT 33.7 (L) 08/08/2021    MCV 96.4 08/08/2021     08/08/2021     Lab Results   Component Value Date     08/08/2021    K 3.5 08/08/2021    K 3.3 01/24/2018     08/08/2021    CO2 26 08/08/2021    BUN 32 08/08/2021    CREATININE 0.9 08/08/2021    GLUCOSE 112 08/08/2021    CALCIUM 9.3 08/08/2021            ASSESSMENT AND PLAN     Active Problems:    Essential hypertension    GERD (gastroesophageal reflux disease)    Hypercholesterolemia    Primary osteoarthritis involving multiple joints    Upper abdominal pain    Cholelithiasis    Intrahepatic bile duct dilation    Dehydration    Atrophy of pancreas  Resolved Problems:    * No resolved hospital problems.  *      MRI Lumbar spine , there is no emegency in billiary tract , no gall bladder removal warranted , discussed with patient

## 2021-08-09 ENCOUNTER — APPOINTMENT (OUTPATIENT)
Dept: INTERVENTIONAL RADIOLOGY/VASCULAR | Age: 86
DRG: 478 | End: 2021-08-09
Payer: MEDICAID

## 2021-08-09 LAB
A/G RATIO: 0.9 (ref 1.1–2.2)
ALBUMIN SERPL-MCNC: 3.3 G/DL (ref 3.4–5)
ALP BLD-CCNC: 77 U/L (ref 40–129)
ALT SERPL-CCNC: 13 U/L (ref 10–40)
ANION GAP SERPL CALCULATED.3IONS-SCNC: 12 MMOL/L (ref 3–16)
AST SERPL-CCNC: 23 U/L (ref 15–37)
BASOPHILS ABSOLUTE: 0.1 K/UL (ref 0–0.2)
BASOPHILS RELATIVE PERCENT: 0.7 %
BILIRUB SERPL-MCNC: 0.3 MG/DL (ref 0–1)
BUN BLDV-MCNC: 30 MG/DL (ref 7–20)
CALCIUM SERPL-MCNC: 9.2 MG/DL (ref 8.3–10.6)
CHLORIDE BLD-SCNC: 105 MMOL/L (ref 99–110)
CO2: 26 MMOL/L (ref 21–32)
CREAT SERPL-MCNC: 1 MG/DL (ref 0.6–1.2)
EOSINOPHILS ABSOLUTE: 0.3 K/UL (ref 0–0.6)
EOSINOPHILS RELATIVE PERCENT: 4.3 %
GFR AFRICAN AMERICAN: >60
GFR NON-AFRICAN AMERICAN: 52
GLOBULIN: 3.5 G/DL
GLUCOSE BLD-MCNC: 100 MG/DL (ref 70–99)
GLUCOSE BLD-MCNC: 111 MG/DL (ref 70–99)
GLUCOSE BLD-MCNC: 127 MG/DL (ref 70–99)
GLUCOSE BLD-MCNC: 136 MG/DL (ref 70–99)
GLUCOSE BLD-MCNC: 179 MG/DL (ref 70–99)
GLUCOSE BLD-MCNC: 180 MG/DL (ref 70–99)
HCT VFR BLD CALC: 37 % (ref 36–48)
HEMOGLOBIN: 12.1 G/DL (ref 12–16)
INR BLD: 0.97 (ref 0.88–1.12)
LYMPHOCYTES ABSOLUTE: 2.3 K/UL (ref 1–5.1)
LYMPHOCYTES RELATIVE PERCENT: 30.4 %
MCH RBC QN AUTO: 31.3 PG (ref 26–34)
MCHC RBC AUTO-ENTMCNC: 32.6 G/DL (ref 31–36)
MCV RBC AUTO: 95.8 FL (ref 80–100)
MONOCYTES ABSOLUTE: 0.5 K/UL (ref 0–1.3)
MONOCYTES RELATIVE PERCENT: 6.2 %
NEUTROPHILS ABSOLUTE: 4.4 K/UL (ref 1.7–7.7)
NEUTROPHILS RELATIVE PERCENT: 58.4 %
PDW BLD-RTO: 15.8 % (ref 12.4–15.4)
PERFORMED ON: ABNORMAL
PLATELET # BLD: 198 K/UL (ref 135–450)
PMV BLD AUTO: 8.5 FL (ref 5–10.5)
POTASSIUM SERPL-SCNC: 3.6 MMOL/L (ref 3.5–5.1)
PROTHROMBIN TIME: 10.9 SEC (ref 9.9–12.7)
RBC # BLD: 3.86 M/UL (ref 4–5.2)
SODIUM BLD-SCNC: 143 MMOL/L (ref 136–145)
TOTAL PROTEIN: 6.8 G/DL (ref 6.4–8.2)
WBC # BLD: 7.5 K/UL (ref 4–11)

## 2021-08-09 PROCEDURE — 1200000000 HC SEMI PRIVATE

## 2021-08-09 PROCEDURE — 6370000000 HC RX 637 (ALT 250 FOR IP): Performed by: INTERNAL MEDICINE

## 2021-08-09 PROCEDURE — 97530 THERAPEUTIC ACTIVITIES: CPT

## 2021-08-09 PROCEDURE — 85025 COMPLETE CBC W/AUTO DIFF WBC: CPT

## 2021-08-09 PROCEDURE — 36415 COLL VENOUS BLD VENIPUNCTURE: CPT

## 2021-08-09 PROCEDURE — 97161 PT EVAL LOW COMPLEX 20 MIN: CPT

## 2021-08-09 PROCEDURE — 97165 OT EVAL LOW COMPLEX 30 MIN: CPT

## 2021-08-09 PROCEDURE — 6360000002 HC RX W HCPCS: Performed by: INTERNAL MEDICINE

## 2021-08-09 PROCEDURE — 85610 PROTHROMBIN TIME: CPT

## 2021-08-09 PROCEDURE — 97535 SELF CARE MNGMENT TRAINING: CPT

## 2021-08-09 PROCEDURE — 80053 COMPREHEN METABOLIC PANEL: CPT

## 2021-08-09 RX ADMIN — TIMOLOL MALEATE 1 DROP: 5 SOLUTION OPHTHALMIC at 10:03

## 2021-08-09 RX ADMIN — Medication 1 TABLET: at 10:00

## 2021-08-09 RX ADMIN — INSULIN GLARGINE 25 UNITS: 100 INJECTION, SOLUTION SUBCUTANEOUS at 10:17

## 2021-08-09 RX ADMIN — ATENOLOL 12.5 MG: 25 TABLET ORAL at 10:02

## 2021-08-09 RX ADMIN — Medication 1000 MG: at 21:10

## 2021-08-09 RX ADMIN — ALLOPURINOL 100 MG: 100 TABLET ORAL at 10:02

## 2021-08-09 RX ADMIN — INSULIN LISPRO 10 UNITS: 100 INJECTION, SOLUTION INTRAVENOUS; SUBCUTANEOUS at 10:17

## 2021-08-09 RX ADMIN — DORZOLAMIDE HYDROCHLORIDE 1 DROP: 20 SOLUTION/ DROPS OPHTHALMIC at 21:11

## 2021-08-09 RX ADMIN — POLYETHYLENE GLYCOL 3350 17 G: 17 POWDER, FOR SOLUTION ORAL at 10:00

## 2021-08-09 RX ADMIN — ESTRADIOL 2 G: 0.1 CREAM VAGINAL at 21:09

## 2021-08-09 RX ADMIN — AMLODIPINE BESYLATE 10 MG: 5 TABLET ORAL at 10:00

## 2021-08-09 RX ADMIN — TIMOLOL MALEATE 1 DROP: 5 SOLUTION OPHTHALMIC at 21:11

## 2021-08-09 RX ADMIN — MICONAZOLE NITRATE: 20 POWDER TOPICAL at 10:03

## 2021-08-09 RX ADMIN — TORSEMIDE 60 MG: 20 TABLET ORAL at 10:01

## 2021-08-09 RX ADMIN — Medication 400 UNITS: at 10:01

## 2021-08-09 RX ADMIN — BRIMONIDINE TARTRATE 1 DROP: 2 SOLUTION OPHTHALMIC at 21:11

## 2021-08-09 RX ADMIN — ACETAMINOPHEN 500 MG: 500 TABLET, FILM COATED ORAL at 10:01

## 2021-08-09 RX ADMIN — ACETAMINOPHEN 500 MG: 500 TABLET, FILM COATED ORAL at 21:09

## 2021-08-09 RX ADMIN — ASPIRIN 81 MG: 81 TABLET, CHEWABLE ORAL at 10:01

## 2021-08-09 RX ADMIN — MICONAZOLE NITRATE: 20 POWDER TOPICAL at 21:00

## 2021-08-09 RX ADMIN — PANTOPRAZOLE SODIUM 40 MG: 40 TABLET, DELAYED RELEASE ORAL at 06:55

## 2021-08-09 RX ADMIN — DORZOLAMIDE HYDROCHLORIDE 1 DROP: 20 SOLUTION/ DROPS OPHTHALMIC at 10:03

## 2021-08-09 RX ADMIN — STANDARDIZED SENNA CONCENTRATE AND DOCUSATE SODIUM 1 TABLET: 8.6; 5 TABLET ORAL at 10:01

## 2021-08-09 RX ADMIN — ACETAMINOPHEN 500 MG: 500 TABLET, FILM COATED ORAL at 16:23

## 2021-08-09 RX ADMIN — BRIMONIDINE TARTRATE 1 DROP: 2 SOLUTION OPHTHALMIC at 10:03

## 2021-08-09 RX ADMIN — CETIRIZINE HYDROCHLORIDE 5 MG: 10 TABLET, FILM COATED ORAL at 10:07

## 2021-08-09 RX ADMIN — ATORVASTATIN CALCIUM 10 MG: 40 TABLET, FILM COATED ORAL at 10:00

## 2021-08-09 RX ADMIN — Medication 1 CAPSULE: at 10:04

## 2021-08-09 RX ADMIN — INSULIN LISPRO 10 UNITS: 100 INJECTION, SOLUTION INTRAVENOUS; SUBCUTANEOUS at 11:50

## 2021-08-09 ASSESSMENT — PAIN DESCRIPTION - LOCATION
LOCATION: BACK
LOCATION: BACK
LOCATION: ABDOMEN

## 2021-08-09 ASSESSMENT — PAIN SCALES - GENERAL
PAINLEVEL_OUTOF10: 0
PAINLEVEL_OUTOF10: 2

## 2021-08-09 ASSESSMENT — ENCOUNTER SYMPTOMS
COUGH: 0
SORE THROAT: 0
CHEST TIGHTNESS: 0
NAUSEA: 1
BACK PAIN: 1
EYE REDNESS: 0
EYE PAIN: 0
FACIAL SWELLING: 0
DIARRHEA: 0
RHINORRHEA: 0
CONSTIPATION: 0
SHORTNESS OF BREATH: 0
ABDOMINAL PAIN: 1

## 2021-08-09 ASSESSMENT — PAIN DESCRIPTION - DESCRIPTORS
DESCRIPTORS: DISCOMFORT
DESCRIPTORS: ACHING;DISCOMFORT

## 2021-08-09 ASSESSMENT — PAIN DESCRIPTION - ORIENTATION
ORIENTATION: RIGHT

## 2021-08-09 ASSESSMENT — PAIN DESCRIPTION - PAIN TYPE
TYPE: ACUTE PAIN

## 2021-08-09 ASSESSMENT — PAIN DESCRIPTION - FREQUENCY
FREQUENCY: INTERMITTENT
FREQUENCY: CONTINUOUS

## 2021-08-09 ASSESSMENT — PAIN DESCRIPTION - ONSET
ONSET: ON-GOING
ONSET: ON-GOING

## 2021-08-09 ASSESSMENT — PAIN - FUNCTIONAL ASSESSMENT: PAIN_FUNCTIONAL_ASSESSMENT: ACTIVITIES ARE NOT PREVENTED

## 2021-08-09 NOTE — PROGRESS NOTES
1030  Morning assessment completed, patient denies needs at this time, call light in reach and bed alarm on. Will continue to monitor. The care plan and education has been reviewed and mutually agreed upon with the patient. Educated patient on the potential for falls during their hospital stay. Reviewed current fall safety protocol. Reviewed indication for use of bed alarm. Patient verbalized understanding. 1:27 PM  Patient turned and repositioned in the bed. Yessenia care given. Sujatha Martinez changed. Mepilex placed to coccyx for prevention.

## 2021-08-09 NOTE — PROGRESS NOTES
Department of Internal Medicine  General Internal Medicine   Progress Note      SUBJECTIVE: pain right lower back which radiates around to front     History obtained from chart review and the patient  General ROS: positive for  - fatigue and malaise  negative for - chills, fever or night sweats  Psychological ROS: negative  Ophthalmic ROS: negative  Respiratory ROS: no cough, shortness of breath, or wheezing  Cardiovascular ROS: no chest pain or dyspnea on exertion  Gastrointestinal ROS: no abdominal pain, change in bowel habits, or black or bloody stools  Genito-Urinary ROS: no dysuria, trouble voiding, or hematuria  Musculoskeletal ROS: negative except severe low back pain   Neurological ROS: no TIA or stroke symptoms  Dermatological ROS: negative    OBJECTIVE      Medications      Current Facility-Administered Medications: mineral oil liquid 30 mL, 30 mL, Oral, Once  bisacodyl (DULCOLAX) suppository 10 mg, 10 mg, Rectal, Once  acetaminophen (TYLENOL) tablet 500 mg, 500 mg, Oral, TID  allopurinol (ZYLOPRIM) tablet 100 mg, 100 mg, Oral, Daily  amLODIPine (NORVASC) tablet 10 mg, 10 mg, Oral, Daily  aspirin chewable tablet 81 mg, 81 mg, Oral, Daily  atenolol (TENORMIN) tablet 12.5 mg, 12.5 mg, Oral, Daily  atorvastatin (LIPITOR) tablet 10 mg, 10 mg, Oral, Daily  brimonidine (ALPHAGAN) 0.2 % ophthalmic solution 1 drop, 1 drop, Both Eyes, BID  estradiol (ESTRACE) vaginal cream 2 g, 2 g, Vaginal, Once per day on Mon Thu  hydrocortisone 2.5 % ointment, , Topical, Daily PRN  insulin glargine (LANTUS;BASAGLAR) injection pen 25 Units, 25 Units, Subcutaneous, Daily  insulin lispro (1 Unit Dial) 10 Units, 10 Units, Subcutaneous, TID AC  lactobacillus (CULTURELLE) capsule 1 capsule, 1 capsule, Oral, Daily  cetirizine (ZYRTEC) tablet 5 mg, 5 mg, Oral, Daily  miconazole (MICOTIN) 2 % powder, , Topical, BID  therapeutic multivitamin-minerals 1 tablet, 1 tablet, Oral, Daily  pantoprazole (PROTONIX) tablet 40 mg, 40 mg, Oral, QAM AC  polyethylene glycol (GLYCOLAX) packet 17 g, 17 g, Oral, Daily  sennosides-docusate sodium (SENOKOT-S) 8.6-50 MG tablet 1 tablet, 1 tablet, Oral, Daily  torsemide (DEMADEX) tablet 60 mg, 60 mg, Oral, Daily  vitamin E capsule 400 Units, 400 Units, Oral, Daily  0.9 % sodium chloride infusion, , Intravenous, Continuous  cefTRIAXone (ROCEPHIN) 1000 mg in sterile water 10 mL IV syringe, 1,000 mg, Intravenous, Q24H  HYDROmorphone HCl PF (DILAUDID) injection 0.5 mg, 0.5 mg, Intravenous, Q4H PRN  ondansetron (ZOFRAN) injection 4 mg, 4 mg, Intravenous, Q6H PRN  dorzolamide (TRUSOPT) 2 % ophthalmic solution 1 drop, 1 drop, Both Eyes, BID **AND** timolol (TIMOPTIC) 0.5 % ophthalmic solution 1 drop, 1 drop, Both Eyes, BID  glucose (GLUTOSE) 40 % oral gel 15 g, 15 g, Oral, PRN  dextrose 50 % IV solution, 12.5 g, Intravenous, PRN  glucagon (rDNA) injection 1 mg, 1 mg, Intramuscular, PRN  dextrose 5 % solution, 100 mL/hr, Intravenous, PRN    Physical      Vitals: BP (!) 145/69   Pulse 55   Temp 98.2 °F (36.8 °C) (Oral)   Resp 14   Ht 5' 8\" (1.727 m)   Wt 269 lb 13.5 oz (122.4 kg)   SpO2 95%   BMI 41.03 kg/m²   Temp: Temp: 98.2 °F (36.8 °C)  Max: Temp  Av.9 °F (36.6 °C)  Min: 97.7 °F (36.5 °C)  Max: 98.2 °F (36.8 °C)  Respiration range:  Resp  Avg: 15.3  Min: 14  Max: 16  Pulse Range:  Pulse  Av.3  Min: 55  Max: 66  Blood pressure range:  Systolic (10SVO), RXL:982 , Min:145 , IDZ:806   , Diastolic (61IRP), TRAV:41, Min:58, Max:75    SpO2  Av %  Min: 95 %  Max: 98 %    Intake/Output Summary (Last 24 hours) at 2021 2249  Last data filed at 2021 1210  Gross per 24 hour   Intake 564.18 ml   Output 2200 ml   Net -1635.82 ml       Vent settings:  Pulse  Av.4  Min: 55  Max: 94  Resp  Av.9  Min: 14  Max: 20  SpO2  Av.1 %  Min: 94 %  Max: 100 %    CONSTITUTIONAL:  fatigued, alert, cooperative, mild distress, appears older than stated age and morbidly obese  EYES:  Unremarkable   NECK:  No JVD and supple, symmetrical, trachea midline  BACK:  Tender  and symmetric  LUNGS:  no increased work of breathing, good air exchange, no retractions and no crackles or wheezing  CARDIOVASCULAR:  normal apical pulses, regular rate and rhythm, normal S1 and S2 and no S3  ABDOMEN:  Soft BS + non tender   MUSCULOSKELETAL:  Trace edema , tenderness Lumbar spine no spasm   NEUROLOGIC:  Awake, alert, oriented to name, place and time. General weakness grossly intact  Lower extremity weakness  , babinski absent . SKIN:  Warm dry and pale  and no bruising or bleeding    Data      Lab Results   Component Value Date    WBC 6.9 08/08/2021    HGB 11.0 (L) 08/08/2021    HCT 33.7 (L) 08/08/2021    MCV 96.4 08/08/2021     08/08/2021     Lab Results   Component Value Date     08/08/2021    K 3.5 08/08/2021    K 3.3 01/24/2018     08/08/2021    CO2 26 08/08/2021    BUN 32 08/08/2021    CREATININE 0.9 08/08/2021    GLUCOSE 112 08/08/2021    CALCIUM 9.3 08/08/2021            ASSESSMENT AND PLAN     Active Problems:    Essential hypertension    GERD (gastroesophageal reflux disease)    Hypercholesterolemia    Primary osteoarthritis involving multiple joints    Upper abdominal pain    Cholelithiasis    Intrahepatic bile duct dilation    Dehydration    Atrophy of pancreas  Resolved Problems:    * No resolved hospital problems.  *      MRI Lumbar spine , reveals fracture L1    IR consult for consideration of kyphoplasty

## 2021-08-09 NOTE — PROGRESS NOTES
Shift assessment completed. A&O x4. Bowel sounds hypoactive in all 4 quadrants. Bed in lowest position, call light and table within reach, x2 side rails, no slip socks on and fall sign posted. The care plan and education has been reviewed and mutually agreed upon with the patient.

## 2021-08-09 NOTE — CARE COORDINATION
Updated [facility] at [facility] on pt's anticipated dc date-she will inform nursing unit.   Electronically signed by RICKY Cotto on 8/9/2021 at 11:25 AM

## 2021-08-09 NOTE — PROGRESS NOTES
Physical Therapy    Facility/Department: [address] ORTHO/NEURO NURSING  Initial Assessment    NAME: Alpa Bruce  : 3/20/1933  MRN: [id number]    Date of Service: 2021    Discharge Recommendations:Martine Molina scored a  on the AM-PAC short mobility form. At this time, no further PT is recommended upon discharge due to functional dependence prior to admission. Recommend patient returns to prior setting with prior services. PT Equipment Recommendations  Equipment Needed: No    Assessment   Body structures, Functions, Activity limitations: Decreased functional mobility ; Decreased ADL status; Decreased ROM; Decreased strength;Decreased endurance;Decreased balance; Increased pain;Decreased posture  Assessment: Pt presents as near her baseline function, though demonstrates increased difficulty complete seated activity at EOB compared to her baseline. Pt would benefit from skilled PT services to promote safe return to PLOF. Prognosis: Fair  Decision Making: Low Complexity  PT Education: Goals;Plan of Care;PT Role  Patient Education: D/C recommendations--pt verbalized understanding  REQUIRES PT FOLLOW UP: Yes  Activity Tolerance  Activity Tolerance: Patient Tolerated treatment well;Patient limited by pain       Patient Diagnosis(es): The primary encounter diagnosis was Upper abdominal pain. A diagnosis of Dilated intrahepatic bile duct was also pertinent to this visit.      has a past medical history of CHF (congestive heart failure) ([address] Diabetes mellitus ([address] Edema leg, ESBL (extended spectrum beta-lactamase) producing bacteria infection, GERD (gastroesophageal reflux disease), Gout, Hypercholesteremia, Hypertension, Legal blindness, Lumbar spinal stenosis, Osteoarthritis, Thyroid disease, and UTI.   has a past surgical history that includes Leg Surgery; Dilation and curettage of uterus; Varicose vein surgery; and eye surgery. Restrictions  Restrictions/Precautions  Restrictions/Precautions: Fall Risk, Contact Precautions  Required Braces or Orthoses?: No  Position Activity Restriction  Spinal Precautions:  (subacute to chronic L1 compression fracture; possible kyphoplasty)  Other position/activity restrictions: Wendie Oleary is a 80 y.o. female who presents to the emergency department today for evaluation for right upper quadrant abdominal pain and right-sided flank pain. The patient states that she has been experiencing the symptoms and she states that they have been ongoing for the past 7 to 10 days. The patient states that initially they thought that her pain was due to a muscle relaxer, as she states that her pain would worsen if she would move around. The patient states that she continued to have pain, and she states an ultrasound was performed 2 days ago which did show gallstones patient states that she continues to have pain although she states that she is at the same pain for the past 7 to 10days patient currently is rating her pain as a 6/10. She states that her pain improves if she lies on her left side, and does not move, seems to be worse with movement. Patient states that she was nauseous couple days ago but is otherwise not had any nausea, vomiting or diarrhea. She is not had any fever or chills. No cough or congestion. No chest pain or shortness of breath. No urinary symptoms. Vision/Hearing  Vision: Impaired  Vision Exceptions: Legally blind (can see outlines of shapes from L eye, R eye blind)  Hearing: Within functional limits     Subjective  General  Chart Reviewed:  Yes  Additional Pertinent Hx: CHF, DM, legally blind  Response To Previous Treatment: Not applicable  Family / Caregiver Present: No  Diagnosis: Abdominal pain, L1 compression fracture  Follows Commands: Within Functional Limits  General Comment  Comments: Pt supine in bed upon arrival.  Subjective  Subjective: Pt agreeable to PT/OT mobility  Rolling to Left: Maximum assistance  Rolling to Right: Moderate assistance  Supine to Sit: 2 Person assistance;Dependent/Total (Max + Mod A)  Sit to Supine: Dependent/Total;2 Person assistance (Mod A x2)  Scooting: Dependent/Total  Transfers  Sit to Stand: Unable to assess  Ambulation  Ambulation?: No  Stairs/Curb  Stairs?: No     Balance  Posture: Fair  Sitting - Static: Fair;- (posterior lean at EOB. Pt attributing to inadequate forward scooting to EOB, however, pt also unable to maintain anterior lean for upright positioning at EOB without assistance for initial 10 minutes at EOB. For remaining 2 minutes, pt maintains with CGA)  Sitting - Dynamic: Poor  Standing - Static:  (N/T)  Standing - Dynamic:  (N/T)        Plan   Plan  Times per week: 1-2x  Times per day: Daily  Current Treatment Recommendations: Strengthening, ROM, Balance Training, Functional Mobility Training, Transfer Training, Endurance Training, Safety Education & Training, Patient/Caregiver Education & Training, Home Exercise Program  Safety Devices  Type of devices: All fall risk precautions in place, Call light within reach, Gait belt, Patient at risk for falls, Nurse notified, Bed alarm in place, Left in bed  Restraints  Initially in place: No    G-Code       OutComes Score                                                  AM-PAC Score  AM-PAC Inpatient Mobility Raw Score : 7 (08/09/21 1207)  AM-PAC Inpatient T-Scale Score : 26.42 (08/09/21 1207)  Mobility Inpatient CMS 0-100% Score: 92.36 (08/09/21 1207)  Mobility Inpatient CMS G-Code Modifier : CM (08/09/21 1207)          Goals  Short term goals  Time Frame for Short term goals:  To be met prior to discharge  Short term goal 1: Pt will complete rolling B with mod A  Short term goal 2: Pt will complete sit to/from supine with mod A  Short term goal 3: Pt will sit EOB x15 minutes with CGA       Therapy Time   Individual Concurrent Group Co-treatment   Time In 0473         Time Out 0915 Minutes 43         Timed Code Treatment Minutes: [address] [facility],[address], PT   Susannah Alvarez, [address] [facility], DPT, [id number]

## 2021-08-09 NOTE — CONSULTS
[facility]  INTERVENTIONAL-RADIOLOGY    INPATIENT CONSULT       Reason for consult: L1 Compression Fracture  Consulting Provider: Vicente Casillas MD    Name: Miriam Yeh  :  3/20/1933  MRN: [id number]  Date: 21      CC:   Chief Complaint   Patient presents with    Abdominal Pain     Pt. arrived via EMS with c/o abdominal pain, flank pain, and states that she had a US done yesterday at the facility and stated that the tech told her that there are gallstones. Pt. is unsure of how many present       HPI: Miriam Yeh is an 80 y.o. female admitted on 2021 after having some RUQ pain thought to be from gallstones. She had low back pain as well and was found to have L1 compression fracture on MRI. We have been asked to evaluate for possible kyphoplasty of L1. She reports that she fell about 2 months ago and suffered fracture in her legs after falling in the bath tub. States she did not have any back pain at that time. She reports back pain started about 2 weeks ago. Describes as a sharp pain in the middle of her low back. States it feels like she is laying on a baseball. It is worse with movement, better with rest. Denies radiation down her legs. Denies injury to back other than fall 2 months ago. Denies CP, SOB, Leg Swelling, Fever/chills.      ASSESSMENT/PLAN  Active Problems:  L1 Compression fracture  -appreciate consult  -MRI reviewed  -unsure if acute or chronic  -story fits for acute fracture within last 2 weeks  -will obtain NM Bone Scan to confirm acute vs chronic  -likely will benefit from kypho if acute  -no contraindication to kypho if acute  -will hold ASA in meantime and await NM results    Essential hypertension  GERD (gastroesophageal reflux disease)  Hypercholesterolemia  Primary osteoarthritis involving multiple joints  -Per primary    Upper abdominal pain  Cholelithiasis  Intrahepatic bile duct dilation  -GI and Gen Surgery following  -Do not feel this is related to Services:     Active Member of Clubs or Organizations:     Attends Club or Organization Meetings:     Marital Status:    Intimate Partner Violence:     Fear of Current or Ex-Partner:     Emotionally Abused:     Physically Abused:     Sexually Abused:           Allergies   Allergen Reactions    Adhesive Tape      And bandaids cause skin irritation    Simvastatin Other (See Comments)     Muscle weakness on simvastatin      Current Facility-Administered Medications: mineral oil liquid 30 mL, 30 mL, Oral, Once  bisacodyl (DULCOLAX) suppository 10 mg, 10 mg, Rectal, Once  acetaminophen (TYLENOL) tablet 500 mg, 500 mg, Oral, TID  allopurinol (ZYLOPRIM) tablet 100 mg, 100 mg, Oral, Daily  amLODIPine (NORVASC) tablet 10 mg, 10 mg, Oral, Daily  aspirin chewable tablet 81 mg, 81 mg, Oral, Daily  atenolol (TENORMIN) tablet 12.5 mg, 12.5 mg, Oral, Daily  atorvastatin (LIPITOR) tablet 10 mg, 10 mg, Oral, Daily  brimonidine (ALPHAGAN) 0.2 % ophthalmic solution 1 drop, 1 drop, Both Eyes, BID  estradiol (ESTRACE) vaginal cream 2 g, 2 g, Vaginal, Once per day on Mon Thu  hydrocortisone 2.5 % ointment, , Topical, Daily PRN  insulin glargine (LANTUS;BASAGLAR) injection pen 25 Units, 25 Units, Subcutaneous, Daily  insulin lispro (1 Unit Dial) 10 Units, 10 Units, Subcutaneous, TID AC  lactobacillus (CULTURELLE) capsule 1 capsule, 1 capsule, Oral, Daily  cetirizine (ZYRTEC) tablet 5 mg, 5 mg, Oral, Daily  miconazole (MICOTIN) 2 % powder, , Topical, BID  therapeutic multivitamin-minerals 1 tablet, 1 tablet, Oral, Daily  pantoprazole (PROTONIX) tablet 40 mg, 40 mg, Oral, QAM AC  polyethylene glycol (GLYCOLAX) packet 17 g, 17 g, Oral, Daily  sennosides-docusate sodium (SENOKOT-S) 8.6-50 MG tablet 1 tablet, 1 tablet, Oral, Daily  torsemide (DEMADEX) tablet 60 mg, 60 mg, Oral, Daily  vitamin E capsule 400 Units, 400 Units, Oral, Daily  0.9 % sodium chloride infusion, , Intravenous, Continuous  cefTRIAXone (ROCEPHIN) 1000 mg in sterile water 10 mL IV syringe, 1,000 mg, Intravenous, Q24H  HYDROmorphone HCl PF (DILAUDID) injection 0.5 mg, 0.5 mg, Intravenous, Q4H PRN  ondansetron (ZOFRAN) injection 4 mg, 4 mg, Intravenous, Q6H PRN  dorzolamide (TRUSOPT) 2 % ophthalmic solution 1 drop, 1 drop, Both Eyes, BID **AND** timolol (TIMOPTIC) 0.5 % ophthalmic solution 1 drop, 1 drop, Both Eyes, BID  glucose (GLUTOSE) 40 % oral gel 15 g, 15 g, Oral, PRN  dextrose 50 % IV solution, 12.5 g, Intravenous, PRN  glucagon (rDNA) injection 1 mg, 1 mg, Intramuscular, PRN  dextrose 5 % solution, 100 mL/hr, Intravenous, PRN     Review of Systems   Constitutional: Negative for diaphoresis, fatigue and fever. HENT: Negative for ear pain, facial swelling, postnasal drip, rhinorrhea and sore throat. Eyes: Negative for pain, redness and visual disturbance. Respiratory: Negative for cough, chest tightness and shortness of breath. Cardiovascular: Negative for chest pain, palpitations and leg swelling. Gastrointestinal: Positive for abdominal pain and nausea. Negative for constipation and diarrhea. Genitourinary: Negative for difficulty urinating. Musculoskeletal: Positive for back pain. Negative for arthralgias and myalgias. Skin: Negative for pallor and rash. Neurological: Negative for dizziness, numbness and headaches. Psychiatric/Behavioral: Negative for agitation, behavioral problems and confusion. /83   Pulse 73   Temp 97.8 °F (36.6 °C) (Oral)   Resp 20   Ht 5' 8\" (1.727 m)   Wt 269 lb 13.5 oz (122.4 kg)   SpO2 100%   BMI 41.03 kg/m²     Physical Exam  Vitals and nursing note reviewed. Constitutional:       Appearance: She is well-developed. She is not diaphoretic. HENT:      Head: Normocephalic and atraumatic. Nose: Nose normal.   Eyes:      General:         Right eye: No discharge. Left eye: No discharge. Cardiovascular:      Rate and Rhythm: Normal rate and regular rhythm.       Heart sounds: Normal heart sounds. No murmur heard. No friction rub. No gallop. Pulmonary:      Effort: Pulmonary effort is normal. No respiratory distress. Breath sounds: Normal breath sounds. No wheezing or rales. Abdominal:      General: Abdomen is flat. Distension: RUQ. Tenderness: There is abdominal tenderness. Musculoskeletal:         General: Normal range of motion. Cervical back: Normal range of motion and neck supple. Lumbar back: No tenderness or bony tenderness (L1). Right lower leg: Edema present. Left lower leg: Edema present. Skin:     General: Skin is warm and dry. Neurological:      General: No focal deficit present. Mental Status: She is alert and oriented to person, place, and time. Mental status is at baseline. Cranial Nerves: No cranial nerve deficit. Motor: No weakness. Psychiatric:         Behavior: Behavior normal.       RECENT IMAGING    MRI LUMBAR SPINE WO CONTRAST   Final Result   Likely subacute to chronic fracture of L1.         CT ABDOMEN PELVIS W IV CONTRAST Additional Contrast? None   Final Result   1. Nonspecific mild intrahepatic and extrahepatic biliary dilatation without   definite obstructing stone within limits of CT. Correlate with laboratory   values. Dedicated MRCP or ERCP could be obtained for further evaluation as   clinically indicated. 2. Small right pleural effusion and associated atelectasis. 3. Severely atrophic pancreas with numerous calcifications present suggesting   sequela of remote pancreatitis. 4. Moderate volume of stool throughout the colon with no evidence for bowel   obstruction. 5. Heterogeneous appearance of the uterus which is not well evaluated on CT. Ultrasound could be obtained for further evaluation on a nonemergent basis as   clinically indicated taking into account patient age and comorbidities. 6. Severe atherosclerotic disease.    7. 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 Approximately 10% height loss. Dedicated MRI of the lumbar spine could be   obtained for further evaluation if the patient has acute back pain. XR CHEST PORTABLE   Final Result   Stable borderline cardiomegaly with minimal central pulmonary congestion or   pulmonary artery hypertension which is less prominent. Mild discoid atelectasis or scarring along the lung bases which is less   prominent.          US GALLBLADDER RUQ    (Results Pending)   IR KYPHOPLASTY LUMBAR 1 VERTEBRAL BODY    (Results Pending)   NM BONE SCAN WHOLE BODY    (Results Pending)         SALOME LUZ PA-C  08/09/21 10:51 AM

## 2021-08-09 NOTE — PROGRESS NOTES
Physician Progress Note      PATIENT:               Pedro Connell  CSN #:                  [id number]  :                       3/20/1933  ADMIT DATE:       2021 3:52 PM  [address] DATE:  RESPONDING  PROVIDER #:        Levonne Sicard MD          QUERY TEXT:    Patient admitted with BMI 41.03. If possible, please document in progress   notes and discharge summary if you are evaluating and /or treating any of the   following: The medical record reflects the following:  Risk Factors: Age, HTN, GERD, Cholelithiasis, L1 fx  Clinical Indicators: BMI 41.03  Treatment: Gastroenterology/General Surgery/IR consults, ivf NS, iv Rocephin,   po Torsemide, monitor labs  Options provided:  -- Obesity  -- Morbid obesity  -- Overweight  -- BMI not clinically significant  -- Other - I will add my own diagnosis  -- Disagree - Not applicable / Not valid  -- Disagree - Clinically unable to determine / Unknown  -- Refer to Clinical Documentation Reviewer    PROVIDER RESPONSE TEXT:    This patient has morbid obesity.     Query created by: Anai Varghese on 2021 2:11 PM      Electronically signed by:  Levonne Sicard MD 2021 4:43 PM

## 2021-08-09 NOTE — PROGRESS NOTES
Occupational Therapy   Occupational Therapy Initial Assessment  Date: 2021   Patient Name: Mary Tuttle  MRN: [id number]     : 3/20/1933    Date of Service: 2021    Discharge Recommendations: Mary Tuttel scored a 13/24 on the -[facility] ADL Inpatient form. At this time, no further OT is recommended upon discharge due to patient near baseline level of function. Recommend patient returns to prior setting with prior services. OT Equipment Recommendations  Equipment Needed: No    Assessment   Performance deficits / Impairments: Decreased functional mobility ; Decreased balance;Decreased high-level IADLs;Decreased endurance;Decreased strength;Decreased ROM; Decreased ADL status  Assessment: Patient presents slightly below baseline level of function. She is assisted with bed mobility at facility but is able to sit EOB with staff to complete exercises -- requires increased assist for seated balance this date. She would benefit from continued OT during hospital stay to decrease caregiver burden and ease occupational pursuits  Treatment Diagnosis: Above deficits associated with abdominal pain  Prognosis: Good  Decision Making: Low Complexity  Exam: as above  OT Education: OT Role;Plan of Care  Patient Education: eval, discharge - patient v/u  REQUIRES OT FOLLOW UP: Yes  Activity Tolerance  Activity Tolerance: Patient Tolerated treatment well;Patient limited by fatigue  Activity Tolerance: Limited by pain with bed mobility  Safety Devices  Safety Devices in place: Yes  Type of devices: All fall risk precautions in place;Call light within reach; Bed alarm in place;Nurse notified; Left in bed           Patient Diagnosis(es): The primary encounter diagnosis was Upper abdominal pain. A diagnosis of Dilated intrahepatic bile duct was also pertinent to this visit.      has a past medical history of CHF (congestive heart failure) ([facility] [address] Diabetes mellitus ([facility] [address] Edema leg, ESBL (extended spectrum beta-lactamase) producing bacteria infection, GERD (gastroesophageal reflux disease), Gout, Hypercholesteremia, Hypertension, Legal blindness, Lumbar spinal stenosis, Osteoarthritis, Thyroid disease, and UTI.   has a past surgical history that includes Leg Surgery; Dilation and curettage of uterus; Varicose vein surgery; and eye surgery. Treatment Diagnosis: Above deficits associated with abdominal pain      Restrictions  Restrictions/Precautions  Restrictions/Precautions: Fall Risk, Contact Precautions  Required Braces or Orthoses?: No  Position Activity Restriction  Spinal Precautions:  (subacute to chronic L1 compression fracture; possible kyphoplasty)  Other position/activity restrictions: Phoenix Greenberg is a 80 y.o. female who presents to the emergency department today for evaluation for right upper quadrant abdominal pain and right-sided flank pain. The patient states that she has been experiencing the symptoms and she states that they have been ongoing for the past 7 to 10 days. The patient states that initially they thought that her pain was due to a muscle relaxer, as she states that her pain would worsen if she would move around. The patient states that she continued to have pain, and she states an ultrasound was performed 2 days ago which did show gallstones patient states that she continues to have pain although she states that she is at the same pain for the past 7 to 10days patient currently is rating her pain as a 6/10. She states that her pain improves if she lies on her left side, and does not move, seems to be worse with movement. Patient states that she was nauseous couple days ago but is otherwise not had any nausea, vomiting or diarrhea. She is not had any fever or chills. No cough or congestion. No chest pain or shortness of breath. No urinary symptoms.     Subjective   General  Chart Reviewed: Yes  Diagnosis: Abdominal Pain  Subjective  Subjective: Patient lying upright in bed upon arrival, agreeable to evaluation. Reports 2/10 pain in back at rest which increases with movement  Patient Currently in Pain: Yes  Pain Assessment  Pain Assessment: 0-10  Pain Level: 2  Pain Type: Acute pain  Pain Location: Back  Pain Orientation: Right  Pre Treatment Pain Screening  Intervention List: Patient able to continue with treatment;Nurse/Physician notified  Vital Signs  Patient Currently in Pain: Yes  Social/Functional History  Social/Functional History  Lives With: Alone  Type of Home: Facility ([address]  Bathroom Shower/Tub:  (pt reports completing bed baths with assistance x2/week)  Home Equipment: Wheelchair-manual  ADL Assistance: Needs assistance (pt able to wash face/hands, is assisted with remainder of ADL)  Homemaking Responsibilities: No  Ambulation Assistance: Needs assistance (dependent for w/c propulsion)  Transfer Assistance: Needs assistance (sandi lift for bed <> w/c)  Additional Comments: Pt would sit in w/c chair 3-4 hrs/day. Denies falls in last 6 months but reports episode of metatarsal fx? during slide board transfer in therapy March 2021; pt reports since fracture in March of this year, she has only used sandi lift for transers and therapy has been completing exercises supine or at EOB. Objective   Vision: Impaired  Vision Exceptions: Legally blind (can see outlines of shapes from L eye, R eye blind)  Hearing: Within functional limits    Orientation  Overall Orientation Status: Within Functional Limits  Observation/Palpation  Posture: Fair  Observation: scar to L calf musculature; pt also reports previous injury \"about 2018\" resulting in injury to achilles tendon, but unable to specify if R or L achilles  Balance  Sitting Balance: Minimal assistance (seated EOB ~12 min, initially min/mod A d/t posterior lean progressing to CGA)  Standing Balance: Unable to assess(comment)  ADL  Grooming: Setup (washing face, mouthwash)  UE Bathing:  Moderate assistance  LE Bathing: Dependent/Total  UE Dressing:  (gown change)  Toileting:  (purewick)  Tone RUE  RUE Tone: Normotonic  Tone LUE  LUE Tone: Normotonic  Coordination  Movements Are Fluid And Coordinated: Yes     Bed mobility  Rolling to Left: Maximum assistance  Rolling to Right: Moderate assistance  Supine to Sit: 2 Person assistance;Dependent/Total (Max + Mod A)  Sit to Supine: Dependent/Total;2 Person assistance (Mod A x2)  Scooting: Dependent/Total  Transfers  Transfer Comments: Facility utilizes sandi lift for transfers at baseline     Cognition  Overall Cognitive Status: WFL  Perception  Overall Perceptual Status: WFL     Sensation  Overall Sensation Status: WFL        LUE AROM (degrees)  LUE AROM : Exceptions  LUE General AROM: ~50% shoulder mobility, WFL distally  RUE AROM (degrees)  RUE AROM : Exceptions  RUE General AROM: ~20% shoulder mobility, WFL distally                      Plan   Plan  Times per week: 1-2  Times per day: Daily  Current Treatment Recommendations: Strengthening, Safety Education & Training, Patient/Caregiver Education & Training, Self-Care / ADL, Equipment Evaluation, Education, & procurement, Endurance Training, Balance Training    G-Code     OutComes Score                                                  AM-PAC Score        AM-[facility] Inpatient Daily Activity Raw Score: 13 (08/09/21 1205)  AM-PAC Inpatient ADL T-Scale Score : 32.03 (08/09/21 1205)  ADL Inpatient CMS 0-100% Score: 63.03 (08/09/21 1205)  ADL Inpatient CMS G-Code Modifier : CL (08/09/21 1205)    Goals  Short term goals  Time Frame for Short term goals: discharge  Short term goal 1: Grooming supervision  Short term goal 2: Increase sitting balance to SBA  Short term goal 3:  Increase sitting tolerance to 15+ min for fxl task/ADL  Short term goal 4: Bed mobility mod A  Long term goals  Time Frame for Long term goals : LTG=STG       Therapy Time   Individual Concurrent Group Co-treatment   Time In 0828         Time Out 0925         Minutes 57 Timed Code Treatment Minutes:  42 minutes    Total Treatment Minutes:  57 minutes    BRAYDON Lanza OTR/PAULA [id number]    Lizzette Morales OT

## 2021-08-10 ENCOUNTER — APPOINTMENT (OUTPATIENT)
Dept: ULTRASOUND IMAGING | Age: 86
DRG: 478 | End: 2021-08-10
Payer: MEDICAID

## 2021-08-10 ENCOUNTER — APPOINTMENT (OUTPATIENT)
Dept: NUCLEAR MEDICINE | Age: 86
DRG: 478 | End: 2021-08-10
Payer: MEDICAID

## 2021-08-10 ENCOUNTER — APPOINTMENT (OUTPATIENT)
Dept: INTERVENTIONAL RADIOLOGY/VASCULAR | Age: 86
DRG: 478 | End: 2021-08-10
Payer: MEDICAID

## 2021-08-10 PROBLEM — K81.1 CHRONIC CHOLECYSTITIS: Status: ACTIVE | Noted: 2021-08-10

## 2021-08-10 LAB
A/G RATIO: 0.9 (ref 1.1–2.2)
ALBUMIN SERPL-MCNC: 3.2 G/DL (ref 3.4–5)
ALP BLD-CCNC: 75 U/L (ref 40–129)
ALT SERPL-CCNC: 13 U/L (ref 10–40)
ANION GAP SERPL CALCULATED.3IONS-SCNC: 12 MMOL/L (ref 3–16)
AST SERPL-CCNC: 23 U/L (ref 15–37)
BASOPHILS ABSOLUTE: 0.1 K/UL (ref 0–0.2)
BASOPHILS RELATIVE PERCENT: 0.8 %
BILIRUB SERPL-MCNC: 0.3 MG/DL (ref 0–1)
BUN BLDV-MCNC: 30 MG/DL (ref 7–20)
CALCIUM SERPL-MCNC: 9.3 MG/DL (ref 8.3–10.6)
CHLORIDE BLD-SCNC: 105 MMOL/L (ref 99–110)
CO2: 27 MMOL/L (ref 21–32)
CREAT SERPL-MCNC: 0.9 MG/DL (ref 0.6–1.2)
EOSINOPHILS ABSOLUTE: 0.4 K/UL (ref 0–0.6)
EOSINOPHILS RELATIVE PERCENT: 4.3 %
GFR AFRICAN AMERICAN: >60
GFR NON-AFRICAN AMERICAN: 59
GLOBULIN: 3.5 G/DL
GLUCOSE BLD-MCNC: 140 MG/DL (ref 70–99)
GLUCOSE BLD-MCNC: 89 MG/DL (ref 70–99)
GLUCOSE BLD-MCNC: 90 MG/DL (ref 70–99)
GLUCOSE BLD-MCNC: 92 MG/DL (ref 70–99)
GLUCOSE BLD-MCNC: 92 MG/DL (ref 70–99)
HCT VFR BLD CALC: 34.7 % (ref 36–48)
HEMOGLOBIN: 11.3 G/DL (ref 12–16)
LYMPHOCYTES ABSOLUTE: 2.6 K/UL (ref 1–5.1)
LYMPHOCYTES RELATIVE PERCENT: 32 %
MCH RBC QN AUTO: 31.4 PG (ref 26–34)
MCHC RBC AUTO-ENTMCNC: 32.7 G/DL (ref 31–36)
MCV RBC AUTO: 96 FL (ref 80–100)
MONOCYTES ABSOLUTE: 0.5 K/UL (ref 0–1.3)
MONOCYTES RELATIVE PERCENT: 5.5 %
NEUTROPHILS ABSOLUTE: 4.7 K/UL (ref 1.7–7.7)
NEUTROPHILS RELATIVE PERCENT: 57.4 %
PDW BLD-RTO: 15.6 % (ref 12.4–15.4)
PERFORMED ON: ABNORMAL
PERFORMED ON: NORMAL
PLATELET # BLD: 211 K/UL (ref 135–450)
PMV BLD AUTO: 8.3 FL (ref 5–10.5)
POTASSIUM SERPL-SCNC: 3.7 MMOL/L (ref 3.5–5.1)
RBC # BLD: 3.61 M/UL (ref 4–5.2)
SODIUM BLD-SCNC: 144 MMOL/L (ref 136–145)
TOTAL PROTEIN: 6.7 G/DL (ref 6.4–8.2)
WBC # BLD: 8.2 K/UL (ref 4–11)

## 2021-08-10 PROCEDURE — 99153 MOD SED SAME PHYS/QHP EA: CPT

## 2021-08-10 PROCEDURE — 99152 MOD SED SAME PHYS/QHP 5/>YRS: CPT

## 2021-08-10 PROCEDURE — 80053 COMPREHEN METABOLIC PANEL: CPT

## 2021-08-10 PROCEDURE — 2580000003 HC RX 258: Performed by: RADIOLOGY

## 2021-08-10 PROCEDURE — 6370000000 HC RX 637 (ALT 250 FOR IP): Performed by: INTERNAL MEDICINE

## 2021-08-10 PROCEDURE — 6360000002 HC RX W HCPCS: Performed by: INTERNAL MEDICINE

## 2021-08-10 PROCEDURE — C1713 ANCHOR/SCREW BN/BN,TIS/BN: HCPCS

## 2021-08-10 PROCEDURE — 36415 COLL VENOUS BLD VENIPUNCTURE: CPT

## 2021-08-10 PROCEDURE — 2580000003 HC RX 258: Performed by: PHYSICIAN ASSISTANT

## 2021-08-10 PROCEDURE — 88311 DECALCIFY TISSUE: CPT

## 2021-08-10 PROCEDURE — 85025 COMPLETE CBC W/AUTO DIFF WBC: CPT

## 2021-08-10 PROCEDURE — 6360000002 HC RX W HCPCS: Performed by: RADIOLOGY

## 2021-08-10 PROCEDURE — 78306 BONE IMAGING WHOLE BODY: CPT

## 2021-08-10 PROCEDURE — 88307 TISSUE EXAM BY PATHOLOGIST: CPT

## 2021-08-10 PROCEDURE — 2500000003 HC RX 250 WO HCPCS: Performed by: INTERNAL MEDICINE

## 2021-08-10 PROCEDURE — 1200000000 HC SEMI PRIVATE

## 2021-08-10 PROCEDURE — 3430000000 HC RX DIAGNOSTIC RADIOPHARMACEUTICAL: Performed by: PHYSICIAN ASSISTANT

## 2021-08-10 PROCEDURE — A9503 TC99M MEDRONATE: HCPCS | Performed by: PHYSICIAN ASSISTANT

## 2021-08-10 PROCEDURE — 0PB43ZX EXCISION OF THORACIC VERTEBRA, PERCUTANEOUS APPROACH, DIAGNOSTIC: ICD-10-PCS | Performed by: INTERNAL MEDICINE

## 2021-08-10 PROCEDURE — 76705 ECHO EXAM OF ABDOMEN: CPT

## 2021-08-10 PROCEDURE — 22513 PERQ VERTEBRAL AUGMENTATION: CPT

## 2021-08-10 PROCEDURE — 6360000004 HC RX CONTRAST MEDICATION: Performed by: INTERNAL MEDICINE

## 2021-08-10 PROCEDURE — 0PU43JZ SUPPLEMENT THORACIC VERTEBRA WITH SYNTHETIC SUBSTITUTE, PERCUTANEOUS APPROACH: ICD-10-PCS | Performed by: INTERNAL MEDICINE

## 2021-08-10 PROCEDURE — 0PS43ZZ REPOSITION THORACIC VERTEBRA, PERCUTANEOUS APPROACH: ICD-10-PCS | Performed by: INTERNAL MEDICINE

## 2021-08-10 RX ORDER — LIDOCAINE HYDROCHLORIDE 10 MG/ML
10 INJECTION, SOLUTION EPIDURAL; INFILTRATION; INTRACAUDAL; PERINEURAL ONCE
Status: COMPLETED | OUTPATIENT
Start: 2021-08-10 | End: 2021-08-10

## 2021-08-10 RX ORDER — MIDAZOLAM HYDROCHLORIDE 1 MG/ML
INJECTION INTRAMUSCULAR; INTRAVENOUS
Status: COMPLETED | OUTPATIENT
Start: 2021-08-10 | End: 2021-08-10

## 2021-08-10 RX ORDER — TC 99M MEDRONATE 20 MG/10ML
26.7 INJECTION, POWDER, LYOPHILIZED, FOR SOLUTION INTRAVENOUS
Status: COMPLETED | OUTPATIENT
Start: 2021-08-10 | End: 2021-08-10

## 2021-08-10 RX ORDER — FENTANYL CITRATE 50 UG/ML
INJECTION, SOLUTION INTRAMUSCULAR; INTRAVENOUS
Status: COMPLETED | OUTPATIENT
Start: 2021-08-10 | End: 2021-08-10

## 2021-08-10 RX ORDER — SODIUM CHLORIDE 0.9 % (FLUSH) 0.9 %
5-40 SYRINGE (ML) INJECTION PRN
Status: DISCONTINUED | OUTPATIENT
Start: 2021-08-10 | End: 2021-08-11 | Stop reason: HOSPADM

## 2021-08-10 RX ORDER — KETOROLAC TROMETHAMINE 15 MG/ML
INJECTION, SOLUTION INTRAMUSCULAR; INTRAVENOUS
Status: COMPLETED | OUTPATIENT
Start: 2021-08-10 | End: 2021-08-10

## 2021-08-10 RX ADMIN — FENTANYL CITRATE 25 MCG: 50 INJECTION, SOLUTION INTRAMUSCULAR; INTRAVENOUS at 14:14

## 2021-08-10 RX ADMIN — ACETAMINOPHEN 500 MG: 500 TABLET, FILM COATED ORAL at 22:13

## 2021-08-10 RX ADMIN — KETOROLAC TROMETHAMINE 30 MG: 15 INJECTION, SOLUTION INTRAMUSCULAR; INTRAVENOUS at 14:13

## 2021-08-10 RX ADMIN — ALLOPURINOL 100 MG: 100 TABLET ORAL at 12:12

## 2021-08-10 RX ADMIN — MICONAZOLE NITRATE: 20 POWDER TOPICAL at 22:35

## 2021-08-10 RX ADMIN — AMLODIPINE BESYLATE 10 MG: 5 TABLET ORAL at 12:11

## 2021-08-10 RX ADMIN — CEFAZOLIN 2000 MG: 1 INJECTION, POWDER, FOR SOLUTION INTRAVENOUS at 14:02

## 2021-08-10 RX ADMIN — TORSEMIDE 60 MG: 20 TABLET ORAL at 12:17

## 2021-08-10 RX ADMIN — MIDAZOLAM 0.5 MG: 1 INJECTION INTRAMUSCULAR; INTRAVENOUS at 14:14

## 2021-08-10 RX ADMIN — FENTANYL CITRATE 25 MCG: 50 INJECTION, SOLUTION INTRAMUSCULAR; INTRAVENOUS at 14:02

## 2021-08-10 RX ADMIN — MIDAZOLAM 1 MG: 1 INJECTION INTRAMUSCULAR; INTRAVENOUS at 14:06

## 2021-08-10 RX ADMIN — TIMOLOL MALEATE 1 DROP: 5 SOLUTION OPHTHALMIC at 22:12

## 2021-08-10 RX ADMIN — ATENOLOL 12.5 MG: 25 TABLET ORAL at 12:11

## 2021-08-10 RX ADMIN — Medication 1000 MG: at 22:11

## 2021-08-10 RX ADMIN — MIDAZOLAM 1 MG: 1 INJECTION INTRAMUSCULAR; INTRAVENOUS at 14:02

## 2021-08-10 RX ADMIN — TC 99M MEDRONATE 26.7 MILLICURIE: 20 INJECTION, POWDER, LYOPHILIZED, FOR SOLUTION INTRAVENOUS at 07:20

## 2021-08-10 RX ADMIN — LIDOCAINE HYDROCHLORIDE 10 ML: 10 INJECTION, SOLUTION EPIDURAL; INFILTRATION; INTRACAUDAL; PERINEURAL at 14:51

## 2021-08-10 RX ADMIN — IOPAMIDOL 30 ML: 408 INJECTION, SOLUTION INTRATHECAL at 14:50

## 2021-08-10 RX ADMIN — Medication 10 ML: at 07:20

## 2021-08-10 RX ADMIN — CETIRIZINE HYDROCHLORIDE 5 MG: 10 TABLET, FILM COATED ORAL at 12:12

## 2021-08-10 RX ADMIN — Medication 10 ML: at 22:11

## 2021-08-10 RX ADMIN — FENTANYL CITRATE 25 MCG: 50 INJECTION, SOLUTION INTRAMUSCULAR; INTRAVENOUS at 14:06

## 2021-08-10 RX ADMIN — BRIMONIDINE TARTRATE 1 DROP: 2 SOLUTION OPHTHALMIC at 22:12

## 2021-08-10 RX ADMIN — DORZOLAMIDE HYDROCHLORIDE 1 DROP: 20 SOLUTION/ DROPS OPHTHALMIC at 22:12

## 2021-08-10 RX ADMIN — PANTOPRAZOLE SODIUM 40 MG: 40 TABLET, DELAYED RELEASE ORAL at 06:46

## 2021-08-10 RX ADMIN — ATORVASTATIN CALCIUM 10 MG: 40 TABLET, FILM COATED ORAL at 12:11

## 2021-08-10 ASSESSMENT — PAIN DESCRIPTION - ORIENTATION: ORIENTATION: RIGHT

## 2021-08-10 ASSESSMENT — PAIN SCALES - GENERAL
PAINLEVEL_OUTOF10: 2
PAINLEVEL_OUTOF10: 3
PAINLEVEL_OUTOF10: 0
PAINLEVEL_OUTOF10: 0

## 2021-08-10 ASSESSMENT — PAIN DESCRIPTION - DESCRIPTORS: DESCRIPTORS: DISCOMFORT

## 2021-08-10 ASSESSMENT — PAIN DESCRIPTION - PAIN TYPE: TYPE: ACUTE PAIN

## 2021-08-10 ASSESSMENT — PAIN - FUNCTIONAL ASSESSMENT: PAIN_FUNCTIONAL_ASSESSMENT: ACTIVITIES ARE NOT PREVENTED

## 2021-08-10 ASSESSMENT — PAIN DESCRIPTION - ONSET: ONSET: ON-GOING

## 2021-08-10 ASSESSMENT — PAIN DESCRIPTION - FREQUENCY: FREQUENCY: INTERMITTENT

## 2021-08-10 ASSESSMENT — PAIN DESCRIPTION - LOCATION: LOCATION: ABDOMEN

## 2021-08-10 NOTE — PRE SEDATION
Sedation Pre-Procedure Note    Patient Name: Jac Gao   YOB: 1933  Room/Bed: [facility]8741/9344-45  Medical Record Number: [id number]  Date: 8/10/2021   Time: 2:57 PM       Indication:  T7 Compression Fracture    Consent: I have discussed with the patient and/or the patient representative the indication, alternatives, and the possible risks and/or complications of the planned procedure and the anesthesia methods. The patient and/or patient representative appear to understand and agree to proceed. Vital Signs:   Vitals:    08/10/21 1446   BP: (!) 142/66   Pulse: 60   Resp: 18   Temp: 98.1 °F (36.7 °C)   SpO2: 96%       Past Medical History:   has a past medical history of CHF (congestive heart failure) ([facility] [address] Diabetes mellitus ([facility] [address] Edema leg, ESBL (extended spectrum beta-lactamase) producing bacteria infection, GERD (gastroesophageal reflux disease), Gout, Hypercholesteremia, Hypertension, Legal blindness, Lumbar spinal stenosis, Osteoarthritis, Thyroid disease, and UTI. Past Surgical History:   has a past surgical history that includes Leg Surgery; Dilation and curettage of uterus; Varicose vein surgery; and eye surgery.     Medications:   Scheduled Meds:    mineral oil  30 mL Oral Once    bisacodyl  10 mg Rectal Once    acetaminophen  500 mg Oral TID    allopurinol  100 mg Oral Daily    amLODIPine  10 mg Oral Daily    aspirin  81 mg Oral Daily    atenolol  12.5 mg Oral Daily    atorvastatin  10 mg Oral Daily    brimonidine  1 drop Both Eyes BID    estradiol  2 g Vaginal Once per day on Mon Thu    insulin glargine  25 Units Subcutaneous Daily    insulin lispro (1 Unit Dial)  10 Units Subcutaneous TID AC    lactobacillus  1 capsule Oral Daily    cetirizine  5 mg Oral Daily    miconazole   Topical BID    therapeutic multivitamin-minerals  1 tablet Oral Daily    pantoprazole  40 mg Oral QAM AC    polyethylene glycol  17 g Oral Daily    sennosides-docusate sodium  1 tablet Oral Daily    torsemide  60 mg Oral Daily    vitamin E  400 Units Oral Daily    cefTRIAXone (ROCEPHIN) IV  1,000 mg Intravenous Q24H    dorzolamide  1 drop Both Eyes BID    And    timolol  1 drop Both Eyes BID     Continuous Infusions:    sodium chloride 30 mL/hr at 08/08/21 0518    dextrose       PRN Meds: sodium chloride flush, hydrocortisone, HYDROmorphone, ondansetron, glucose, dextrose, glucagon (rDNA), dextrose  Home Meds:   Prior to Admission medications    Medication Sig Start Date End Date Taking? Authorizing Provider   Lactobacillus (ACIDOPHILUS PO) Take 1 tablet by mouth daily   Yes Historical Provider, MD   allopurinol (ZYLOPRIM) 100 MG tablet Take 100 mg by mouth daily   Yes Historical Provider, MD   miconazole (MICOTIN) 2 % powder Apply topically 2 times daily Apply topically 2 times daily.    Yes Historical Provider, MD   aspirin 81 MG chewable tablet Take 81 mg by mouth daily   Yes Historical Provider, MD   atorvastatin (LIPITOR) 10 MG tablet Take 10 mg by mouth daily   Yes Historical Provider, MD   Insulin Glargine (BASAGLAR KWIKPEN SC) Inject 25 Units into the skin daily   Yes Historical Provider, MD   cloNIDine (CATAPRES) 0.1 MG tablet Take 0.1 mg by mouth 2 times daily as needed for High Blood Pressure (SBP >160)   Yes Historical Provider, MD   estradiol (ESTRACE) 0.1 MG/GM vaginal cream Place 2 g vaginally See Admin Instructions Every Monday and Thursday   Yes Historical Provider, MD   polyethylene glycol (GLYCOLAX) 17 GM/SCOOP powder Take 17 g by mouth daily   Yes Historical Provider, MD   hydrocortisone 2.5 % ointment Apply topically daily Apply to BLE rash   Yes Historical Provider, MD   loperamide (IMODIUM A-D) 2 MG tablet Take 2 mg by mouth 4 times daily as needed for Diarrhea   Yes Historical Provider, MD   insulin lispro (HUMALOG) 100 UNIT/ML injection vial Inject 10 Units into the skin 3 times daily (before meals)   Yes Historical Provider, MD   loratadine (CLARITIN) 10 MG tablet Take 10 mg by mouth daily   Yes Historical Provider, MD   amLODIPine (NORVASC) 10 MG tablet Take 10 mg by mouth nightly    Yes Historical Provider, MD   nystatin (MYCOSTATIN) 016864 UNIT/GM powder Apply topically as needed Under breast   Yes Historical Provider, MD   sennosides-docusate sodium (SENOKOT-S) 8.6-50 MG tablet Take 1 tablet by mouth daily   Yes Historical Provider, MD   torsemide (DEMADEX) 20 MG tablet Take 60 mg by mouth daily   Yes Historical Provider, MD   acetaminophen (TYLENOL) 500 MG tablet Take 500 mg by mouth 3 times daily   Yes Historical Provider, MD   atenolol (TENORMIN) 25 MG tablet Take 0.5 tablets by mouth daily  Patient taking differently: Take 12.5 mg by mouth 2 times daily  2/1/18  Yes Jeanie Tabares MD   Multiple Vitamins-Minerals (THERAPEUTIC MULTIVITAMIN-MINERALS) tablet Take 1 tablet by mouth daily   Yes Historical Provider, MD   vitamin E 400 UNIT capsule Take 400 Units by mouth daily   Yes Historical Provider, MD   brimonidine (ALPHAGAN P) 0.1 % SOLN Place 1 drop into both eyes 2 times daily    Yes Historical Provider, MD   dorzolamide-timolol (COSOPT) 22.3-6.8 MG/ML ophthalmic solution Place 1 drop into both eyes 2 times daily    Yes Historical Provider, MD   omeprazole (PRILOSEC) 10 MG capsule Take 20 mg by mouth daily.    Yes Historical Provider, MD   bisacodyl (DULCOLAX) 10 MG suppository Place 10 mg rectally daily as needed for Constipation    Historical Provider, MD   Calcium 600-400 MG-UNIT CHEW Take 1 tablet by mouth 2 times daily    Historical Provider, MD   magnesium hydroxide (MILK OF MAGNESIA) 400 MG/5ML suspension Take 30 mLs by mouth daily as needed for Constipation    Historical Provider, MD   lisinopril (PRINIVIL;ZESTRIL) 10 MG tablet Take 1 tablet by mouth daily  Patient taking differently: Take 40 mg by mouth daily  2/1/18   Jeanie Tabares MD   furosemide (LASIX) 40 MG tablet Take 1 tablet by mouth daily 1/31/18   Jeanie Tabares MD   docusate sodium

## 2021-08-10 NOTE — PROGRESS NOTES
Shift assessment completed. A&O x4. Bed in lowest position, call light and table within reach, x2 side rails, no slip socks on and fall sign posted. The care plan and education has been reviewed and mutually agreed upon with the patient.

## 2021-08-10 NOTE — PROGRESS NOTES
Department of Internal Medicine  General Internal Medicine   Progress Note      SUBJECTIVE: moderate Pain  , no distress     History obtained from chart review and the patient  General ROS: positive for  - fatigue and malaise  negative for - chills, fever or night sweats  Psychological ROS: negative  Ophthalmic ROS: negative  Respiratory ROS: no cough, shortness of breath, or wheezing  Cardiovascular ROS: no chest pain or dyspnea on exertion  Gastrointestinal ROS: no abdominal pain, change in bowel habits, or black or bloody stools  Genito-Urinary ROS: no dysuria, trouble voiding, or hematuria  Musculoskeletal ROS: negative except severe low back pain   Neurological ROS: no TIA or stroke symptoms  Dermatological ROS: negative    OBJECTIVE      Medications      Current Facility-Administered Medications: sodium chloride flush 0.9 % injection 5-40 mL, 5-40 mL, Intravenous, PRN  mineral oil liquid 30 mL, 30 mL, Oral, Once  bisacodyl (DULCOLAX) suppository 10 mg, 10 mg, Rectal, Once  acetaminophen (TYLENOL) tablet 500 mg, 500 mg, Oral, TID  allopurinol (ZYLOPRIM) tablet 100 mg, 100 mg, Oral, Daily  amLODIPine (NORVASC) tablet 10 mg, 10 mg, Oral, Daily  aspirin chewable tablet 81 mg, 81 mg, Oral, Daily  atenolol (TENORMIN) tablet 12.5 mg, 12.5 mg, Oral, Daily  atorvastatin (LIPITOR) tablet 10 mg, 10 mg, Oral, Daily  brimonidine (ALPHAGAN) 0.2 % ophthalmic solution 1 drop, 1 drop, Both Eyes, BID  estradiol (ESTRACE) vaginal cream 2 g, 2 g, Vaginal, Once per day on Mon Thu  hydrocortisone 2.5 % ointment, , Topical, Daily PRN  insulin glargine (LANTUS;BASAGLAR) injection pen 25 Units, 25 Units, Subcutaneous, Daily  insulin lispro (1 Unit Dial) 10 Units, 10 Units, Subcutaneous, TID AC  lactobacillus (CULTURELLE) capsule 1 capsule, 1 capsule, Oral, Daily  cetirizine (ZYRTEC) tablet 5 mg, 5 mg, Oral, Daily  miconazole (MICOTIN) 2 % powder, , Topical, BID  therapeutic multivitamin-minerals 1 tablet, 1 tablet, Oral, Daily  pantoprazole (PROTONIX) tablet 40 mg, 40 mg, Oral, QAM AC  polyethylene glycol (GLYCOLAX) packet 17 g, 17 g, Oral, Daily  sennosides-docusate sodium (SENOKOT-S) 8.6-50 MG tablet 1 tablet, 1 tablet, Oral, Daily  torsemide (DEMADEX) tablet 60 mg, 60 mg, Oral, Daily  vitamin E capsule 400 Units, 400 Units, Oral, Daily  0.9 % sodium chloride infusion, , Intravenous, Continuous  cefTRIAXone (ROCEPHIN) 1000 mg in sterile water 10 mL IV syringe, 1,000 mg, Intravenous, Q24H  HYDROmorphone HCl PF (DILAUDID) injection 0.5 mg, 0.5 mg, Intravenous, Q4H PRN  ondansetron (ZOFRAN) injection 4 mg, 4 mg, Intravenous, Q6H PRN  dorzolamide (TRUSOPT) 2 % ophthalmic solution 1 drop, 1 drop, Both Eyes, BID **AND** timolol (TIMOPTIC) 0.5 % ophthalmic solution 1 drop, 1 drop, Both Eyes, BID  glucose (GLUTOSE) 40 % oral gel 15 g, 15 g, Oral, PRN  dextrose 50 % IV solution, 12.5 g, Intravenous, PRN  glucagon (rDNA) injection 1 mg, 1 mg, Intramuscular, PRN  dextrose 5 % solution, 100 mL/hr, Intravenous, PRN    Physical      Vitals: BP (!) 149/79   Pulse 63   Temp 98.8 °F (37.1 °C) (Oral)   Resp 16   Ht 5' 8\" (1.727 m)   Wt 269 lb 13.5 oz (122.4 kg)   SpO2 96%   BMI 41.03 kg/m²   Temp: Temp: 98.8 °F (37.1 °C)  Max: Temp  Av.4 °F (36.9 °C)  Min: 97.8 °F (36.6 °C)  Max: 98.8 °F (37.1 °C)  Respiration range:  Resp  Av.3  Min: 14  Max: 20  Pulse Range:  Pulse  Av.8  Min: 60  Max: 73  Blood pressure range:  Systolic (18ZNW), MDC:075 , Min:113 , VYL:245   , Diastolic (90AGC), ZDN:65, Min:56, Max:83    SpO2  Av.3 %  Min: 96 %  Max: 100 %    Intake/Output Summary (Last 24 hours) at 8/10/2021 0811  Last data filed at 8/10/2021 0646  Gross per 24 hour   Intake 4127 ml   Output 600 ml   Net 3527 ml       Vent settings:  Pulse  Av.9  Min: 55  Max: 94  Resp  Av.7  Min: 14  Max: 20  SpO2  Av.1 %  Min: 94 %  Max: 100 %    CONSTITUTIONAL:  fatigued, alert, cooperative, mild distress, appears older than stated age and morbidly obese  EYES:  Unremarkable   NECK:  No JVD  and supple, symmetrical, trachea midline  BACK:  Tender  and symmetric  LUNGS:  no increased work of breathing, good air exchange, no retractions and no crackles or wheezing  CARDIOVASCULAR:  normal apical pulses, regular rate and rhythm, normal S1 and S2 and no S3  ABDOMEN:  Soft BS + non tender   MUSCULOSKELETAL:  Trace edema , tenderness Lumbar spine no spasm   NEUROLOGIC:  Awake, alert, oriented to name, place and time. General weakness grossly intact  Lower extremity weakness  , babinski absent . SKIN:  Warm dry and pale  and no bruising or bleeding    Data      Lab Results   Component Value Date    WBC 8.2 08/10/2021    HGB 11.3 (L) 08/10/2021    HCT 34.7 (L) 08/10/2021    MCV 96.0 08/10/2021     08/10/2021     Lab Results   Component Value Date     08/10/2021    K 3.7 08/10/2021    K 3.3 01/24/2018     08/10/2021    CO2 27 08/10/2021    BUN 30 08/10/2021    CREATININE 0.9 08/10/2021    GLUCOSE 92 08/10/2021    CALCIUM 9.3 08/10/2021            ASSESSMENT AND PLAN     Active Problems:    Essential hypertension    GERD (gastroesophageal reflux disease)    Hypercholesterolemia    Primary osteoarthritis involving multiple joints    Upper abdominal pain    Cholelithiasis    Intrahepatic bile duct dilation    Dehydration    Atrophy of pancreas  Resolved Problems:    * No resolved hospital problems. *      US gall bladder pending   Nuclear med bone scan  And IR Kyphoplasty pending .  Dr Vira Bradshaw on the case

## 2021-08-10 NOTE — BRIEF OP NOTE
Brief Postoperative Note    Jetty Apley  YOB: 1933  [id number]    Pre-operative Diagnosis: T7 Compression Fracture    Post-operative Diagnosis: Same    Procedure: Kyphoplasty T7 Thoracic Vertebrae    Anesthesia: Moderate Sedation    Surgeons/Assistants: SALOME LUZ PA-C, Gregg Mendoza MD    Estimated Blood Loss: less than 5    Complications: None    Specimens: Was Obtained: Bone sample, sent to pathology    Findings: Technically successful kyphoplasty of T7    Electronically signed by Marisa Ledesma PA-C on 8/10/2021 at 2:59 PM

## 2021-08-11 VITALS
HEART RATE: 66 BPM | BODY MASS INDEX: 40.9 KG/M2 | OXYGEN SATURATION: 97 % | WEIGHT: 269.84 LBS | TEMPERATURE: 98.1 F | DIASTOLIC BLOOD PRESSURE: 73 MMHG | RESPIRATION RATE: 16 BRPM | HEIGHT: 68 IN | SYSTOLIC BLOOD PRESSURE: 147 MMHG

## 2021-08-11 LAB
A/G RATIO: 1 (ref 1.1–2.2)
ALBUMIN SERPL-MCNC: 3.1 G/DL (ref 3.4–5)
ALP BLD-CCNC: 92 U/L (ref 40–129)
ALT SERPL-CCNC: 10 U/L (ref 10–40)
ANION GAP SERPL CALCULATED.3IONS-SCNC: 11 MMOL/L (ref 3–16)
AST SERPL-CCNC: 22 U/L (ref 15–37)
BASOPHILS ABSOLUTE: 0 K/UL (ref 0–0.2)
BASOPHILS RELATIVE PERCENT: 0.5 %
BILIRUB SERPL-MCNC: 0.3 MG/DL (ref 0–1)
BUN BLDV-MCNC: 30 MG/DL (ref 7–20)
CALCIUM SERPL-MCNC: 9.1 MG/DL (ref 8.3–10.6)
CHLORIDE BLD-SCNC: 104 MMOL/L (ref 99–110)
CO2: 26 MMOL/L (ref 21–32)
CREAT SERPL-MCNC: 1 MG/DL (ref 0.6–1.2)
EOSINOPHILS ABSOLUTE: 0.4 K/UL (ref 0–0.6)
EOSINOPHILS RELATIVE PERCENT: 6 %
GFR AFRICAN AMERICAN: >60
GFR NON-AFRICAN AMERICAN: 52
GLOBULIN: 3.2 G/DL
GLUCOSE BLD-MCNC: 156 MG/DL (ref 70–99)
GLUCOSE BLD-MCNC: 162 MG/DL (ref 70–99)
GLUCOSE BLD-MCNC: 227 MG/DL (ref 70–99)
HCT VFR BLD CALC: 32.4 % (ref 36–48)
HEMOGLOBIN: 10.7 G/DL (ref 12–16)
LYMPHOCYTES ABSOLUTE: 1.7 K/UL (ref 1–5.1)
LYMPHOCYTES RELATIVE PERCENT: 24 %
MCH RBC QN AUTO: 31.5 PG (ref 26–34)
MCHC RBC AUTO-ENTMCNC: 32.9 G/DL (ref 31–36)
MCV RBC AUTO: 95.7 FL (ref 80–100)
MONOCYTES ABSOLUTE: 0.4 K/UL (ref 0–1.3)
MONOCYTES RELATIVE PERCENT: 5.8 %
NEUTROPHILS ABSOLUTE: 4.5 K/UL (ref 1.7–7.7)
NEUTROPHILS RELATIVE PERCENT: 63.7 %
PDW BLD-RTO: 15.7 % (ref 12.4–15.4)
PERFORMED ON: ABNORMAL
PERFORMED ON: ABNORMAL
PLATELET # BLD: 185 K/UL (ref 135–450)
PMV BLD AUTO: 9.3 FL (ref 5–10.5)
POTASSIUM SERPL-SCNC: 3.2 MMOL/L (ref 3.5–5.1)
RBC # BLD: 3.38 M/UL (ref 4–5.2)
SODIUM BLD-SCNC: 141 MMOL/L (ref 136–145)
TOTAL PROTEIN: 6.3 G/DL (ref 6.4–8.2)
WBC # BLD: 7.1 K/UL (ref 4–11)

## 2021-08-11 PROCEDURE — 80053 COMPREHEN METABOLIC PANEL: CPT

## 2021-08-11 PROCEDURE — 6370000000 HC RX 637 (ALT 250 FOR IP): Performed by: INTERNAL MEDICINE

## 2021-08-11 PROCEDURE — 2500000003 HC RX 250 WO HCPCS: Performed by: INTERNAL MEDICINE

## 2021-08-11 PROCEDURE — 85025 COMPLETE CBC W/AUTO DIFF WBC: CPT

## 2021-08-11 RX ADMIN — PANTOPRAZOLE SODIUM 40 MG: 40 TABLET, DELAYED RELEASE ORAL at 06:27

## 2021-08-11 RX ADMIN — INSULIN LISPRO 10 UNITS: 100 INJECTION, SOLUTION INTRAVENOUS; SUBCUTANEOUS at 09:19

## 2021-08-11 RX ADMIN — STANDARDIZED SENNA CONCENTRATE AND DOCUSATE SODIUM 1 TABLET: 8.6; 5 TABLET ORAL at 09:11

## 2021-08-11 RX ADMIN — Medication 1 CAPSULE: at 09:11

## 2021-08-11 RX ADMIN — DORZOLAMIDE HYDROCHLORIDE 1 DROP: 20 SOLUTION/ DROPS OPHTHALMIC at 09:29

## 2021-08-11 RX ADMIN — BRIMONIDINE TARTRATE 1 DROP: 2 SOLUTION OPHTHALMIC at 09:29

## 2021-08-11 RX ADMIN — TIMOLOL MALEATE 1 DROP: 5 SOLUTION OPHTHALMIC at 09:29

## 2021-08-11 RX ADMIN — Medication 1 TABLET: at 09:11

## 2021-08-11 RX ADMIN — ACETAMINOPHEN 500 MG: 500 TABLET, FILM COATED ORAL at 09:10

## 2021-08-11 RX ADMIN — INSULIN GLARGINE 25 UNITS: 100 INJECTION, SOLUTION SUBCUTANEOUS at 09:26

## 2021-08-11 RX ADMIN — ALLOPURINOL 100 MG: 100 TABLET ORAL at 09:11

## 2021-08-11 RX ADMIN — Medication 400 UNITS: at 09:11

## 2021-08-11 RX ADMIN — AMLODIPINE BESYLATE 10 MG: 5 TABLET ORAL at 09:11

## 2021-08-11 RX ADMIN — ATORVASTATIN CALCIUM 10 MG: 40 TABLET, FILM COATED ORAL at 09:11

## 2021-08-11 RX ADMIN — MICONAZOLE NITRATE: 20 POWDER TOPICAL at 09:15

## 2021-08-11 RX ADMIN — CETIRIZINE HYDROCHLORIDE 5 MG: 10 TABLET, FILM COATED ORAL at 09:11

## 2021-08-11 RX ADMIN — TORSEMIDE 60 MG: 20 TABLET ORAL at 09:11

## 2021-08-11 RX ADMIN — POLYETHYLENE GLYCOL 3350 17 G: 17 POWDER, FOR SOLUTION ORAL at 09:15

## 2021-08-11 RX ADMIN — ATENOLOL 12.5 MG: 25 TABLET ORAL at 09:11

## 2021-08-11 RX ADMIN — ASPIRIN 81 MG: 81 TABLET, CHEWABLE ORAL at 09:11

## 2021-08-11 ASSESSMENT — PAIN SCALES - GENERAL
PAINLEVEL_OUTOF10: 0
PAINLEVEL_OUTOF10: 2
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
## 2021-08-11 NOTE — PROGRESS NOTES
Shift assessment completed. A&O x4, VSS. Bowel sounds hypoactive. Dressing to upper back Intact, moderate amount of drainage noted. Bed in lowest position, call light and table within reach, x2 side rails, no slip socks on and fall sign posted. No further needs expressed at this time. The care plan and education has been reviewed and mutually agreed upon with the patient.

## 2021-08-11 NOTE — CARE COORDINATION
DISCHARGE SUMMARY     DATE OF DISCHARGE: 08/11/21    DISCHARGE DESTINATION: [facility]    Level of Care: Long Term    Report Number: [phone]    Fax Number:  [phone]    Precert Obtained: N/A    Iván Completed: N/A    PASARR: N/A    Notified: RN, Family and Facility/Agency-left message for facility, left message for son to inform of transport time    Prescriptions Faxed:N/A    TRANSPORTATION: [place] [address] Name: [address] [place] [address] up Time: 1PM    Phone Number: [phone]    Electronically signed by RICKY Gil on 8/11/2021 at 11:53 AM

## 2021-08-11 NOTE — PROGRESS NOTES
Report given to to [facility]. AVS faxed to facility per request. Patient aware she will be transferred back to [facility] care where she previously resided. All belongings packed and accounted for. All questions answered. IVs removed. Transport arrived and report given to EMS. Patient was wheeled off unit in no apparent pain or distress.

## 2021-08-19 ENCOUNTER — FOLLOWUP TELEPHONE ENCOUNTER (OUTPATIENT)
Dept: INTERVENTIONAL RADIOLOGY/VASCULAR | Age: 86
End: 2021-08-19

## 2021-08-19 NOTE — PROGRESS NOTES
Spoke to patients nurse at [facility] 1 week post Kyphoplasty procedure. Patients site was clean, dry and intact. Patients nurse said patient denies any discomfort post procedure. I addressed all the patients concerns, and directed patients nurse to contact [facility] if they had any further questions.

## 2021-09-05 PROBLEM — E86.0 DEHYDRATION: Status: RESOLVED | Noted: 2021-08-06 | Resolved: 2021-09-05

## 2021-09-17 NOTE — PROGRESS NOTES
Patient seen , discharge dictated scripts given , arrangements made , ALEXIS completed .  Discussed with nursing staff  And   If applicable ,  Discussed with  Patient's family , all questions answered and concerns addressed  When applicable

## 2021-09-18 NOTE — DISCHARGE SUMMARY
[facility]                     [facility], [address]                               DISCHARGE SUMMARY    PATIENT NAME: Wu Linton                  :        1933  MED REC NO:   [id number]                          ROOM:       [id number]  ACCOUNT NO:   [de-identified]                           ADMIT DATE: 2021  PROVIDER:     Linda Rucker MD                  DISCHARGE DATE:  2021    FINAL DIAGNOSES:  1. Cholelithiasis. 2.  Extrahepatic biliary dilatation. 3.  Atrophic chronic pancreatitis. 4.  Pleural effusion. 5.  Fecal impaction. 6.  Thoracolumbar ankylosis of the spine. 7.  Severe osteopenia. DISCHARGE MEDICATIONS:  1.  Lactobacillus acidophilus one tablet daily. 2.  Zyloprim 100 mg daily. 3.  Micatin powder topically apply b.i.d. on affected area. 4.  Aspirin 81 mg once a day. 5.  Atorvastatin 10 mg once a day. 6.  Insulin glargine 25 units skin nightly. 7.  Estrace 0.1 mg every Monday and Thursday. 8.  MiraLax powder 17 gm once a day. 9.  Hydrocortisone ointment b.i.d. 10.  Loperamide 2 mg four times a day p.r.n.  11.  Humalog 10 units three times a day. 12.  Claritin 10 mg once a day. 13.  Norvasc 10 mg nightly. 14.  Nystatin powder topically underneath the breast.  15.  Senokot-S one tablet daily. 16.  Demadex 20 mg three tablets daily. 17.  Tylenol 500 mg four times a day p.r.n. 18.  Atenolol 0.5 mg daily. 19.  Multivitamin once a day. 20.  Vitamin E 400 units daily. 21. Alphagan eye drops one drop b.i.d.  22.  Cosopt one drop b.i.d.  23.  Prilosec 20 mg once a day. 24.  Dulcolax suppository 10 mg daily. 25.  Calcium one tablet b.i.d.  26.  Oyster shell calcium 500 mg b.i.d.  27.  Colace 100 mg b.i.d.  28.  Sitagliptin 50 mg daily.     HOSPITAL COURSE:  This 51-year-old white American lady with history of  abdominal pain, nausea, has prior history of cholelithiasis, was  evaluated, was slightly disoriented with a temperature of 97.6, blood  pressure 150/73, heart rate was 94. Abdomen has some tenderness. Sodium 141, potassium 4.4, BUN 37, creatinine 1.0. White blood cell  count 6.5. CT of the abdomen and pelvis shows nonspecific mild  intrahepatic and extrahepatic biliary dilatation without definite  obstructing stone. Dedicated ERCP and MRCP was recommended. The  patient did have right-sided pleural effusion with associated  atelectasis, severely atrophic pancreas with numerous calcifications. The patient was treated with IV hydration, IV ceftriaxone, parenteral  analgesics. Dr. Juarez Ludwig was consulted for consideration of ERCP  and therapeutic endoscopy. The patient was given parenteral analgesics  and antiemetic with IV antibiotics. General condition slowly improved. Dr. Cici Mednoza saw the patient. Because the patient was nontender, no  endoscopic workup was planned. Pain was also musculoskeletal in  etiology. Case was to be considered for surgical removal of the  gallbladder. Dr. Wyatt Mauro presently ruled out any urgent need  of doing a cholecystectomy. Dr. Leif Smith also followed up with the  patient. General condition slowly improved. The patient was referred  to Interventional Radiology for kyphoplasty. Maria C Farooq, who is a  physician assistant for Interventional Radiology, evaluated the patient  for L1 compression fracture, ordered an MRI. He made an opinion that  the patient was more consistent with an acute fracture that happened in  last two weeks. The patient was given PT and OT evaluation, pain  relief. The patient was finally referred to kyphoplasty of T7  compression fracture, which was accomplished under moderate sedation by  Jelena Morales MD of Interventional Radiology. Following the  kyphoplasty, the patient was discharged in stable condition to [address] [place] [address]. All the necessary arrangement, ALEXIS and prescription  management were done.   The patient was discharged in stable condition to  [address] [place] [address].         Herber Mendenhall MD    D: 09/17/2021 7:58:52       T: 09/18/2021 2:50:32     [email or web address]  Job#: [id number]     Doc#: [id number]    CC: